# Patient Record
Sex: FEMALE | Race: WHITE | Employment: FULL TIME | URBAN - METROPOLITAN AREA
[De-identification: names, ages, dates, MRNs, and addresses within clinical notes are randomized per-mention and may not be internally consistent; named-entity substitution may affect disease eponyms.]

---

## 2017-05-22 ENCOUNTER — HOSPITAL ENCOUNTER (OUTPATIENT)
Dept: GENERAL RADIOLOGY | Age: 52
Discharge: HOME | End: 2017-05-22
Attending: FAMILY MEDICINE
Payer: COMMERCIAL

## 2017-05-22 ENCOUNTER — TELEPHONE (OUTPATIENT)
Dept: FAMILY MEDICINE CLINIC | Facility: CLINIC | Age: 52
End: 2017-05-22

## 2017-05-22 ENCOUNTER — OFFICE VISIT (OUTPATIENT)
Dept: FAMILY MEDICINE CLINIC | Facility: CLINIC | Age: 52
End: 2017-05-22

## 2017-05-22 VITALS
HEIGHT: 64 IN | BODY MASS INDEX: 36.6 KG/M2 | WEIGHT: 214.38 LBS | HEART RATE: 108 BPM | DIASTOLIC BLOOD PRESSURE: 88 MMHG | RESPIRATION RATE: 16 BRPM | SYSTOLIC BLOOD PRESSURE: 120 MMHG

## 2017-05-22 DIAGNOSIS — M25.511 ACUTE PAIN OF RIGHT SHOULDER: ICD-10-CM

## 2017-05-22 DIAGNOSIS — S43.421A SPRAIN OF RIGHT ROTATOR CUFF CAPSULE, INITIAL ENCOUNTER: Primary | ICD-10-CM

## 2017-05-22 DIAGNOSIS — M25.511 ACUTE PAIN OF RIGHT SHOULDER: Primary | ICD-10-CM

## 2017-05-22 PROCEDURE — 99214 OFFICE O/P EST MOD 30 MIN: CPT | Performed by: FAMILY MEDICINE

## 2017-05-22 PROCEDURE — 73030 X-RAY EXAM OF SHOULDER: CPT | Performed by: FAMILY MEDICINE

## 2017-05-22 RX ORDER — TOPIRAMATE 100 MG/1
2 TABLET, FILM COATED ORAL EVERY MORNING
COMMUNITY
Start: 2017-04-27 | End: 2018-01-15

## 2017-05-22 RX ORDER — TRAZODONE HYDROCHLORIDE 50 MG/1
TABLET ORAL
COMMUNITY
Start: 2017-04-27 | End: 2017-12-04

## 2017-05-22 RX ORDER — TIZANIDINE 4 MG/1
4 TABLET ORAL NIGHTLY PRN
Qty: 20 TABLET | Refills: 0 | Status: SHIPPED | OUTPATIENT
Start: 2017-05-22 | End: 2018-02-26

## 2017-05-22 NOTE — PROGRESS NOTES
Key Hastings is a 47year old female. HPI:       Shoulder Pain: Right shoulder pain, started yesterday after trying to start the . Felt immediate pain when she tried to start the lawnmower.   The pain radiates partly down her arm towards the el Smokeless Status: Never Used                        Alcohol Use: Yes           0.0 - 0.5 oz/week       0-1 Standard drinks or equivalent per week       REVIEW OF SYSTEMS:   Review of Systems   Constitutional: Negative for fever, chills and fatigue. XR SHOULDER, COMPLETE (MIN 2 VIEWS), RIGHT (CPT=73030)         TECHNIQUE:  Multiple views were obtained.      COMPARISON:  None.      INDICATIONS:  M25.511 Pain in right shoulder      PATIENT STATED HISTORY: (As transcribed by Technologist)  Injury to righ

## 2017-05-22 NOTE — PATIENT INSTRUCTIONS
Self-Care for Strains and Sprains  Most minor strains and sprains can be treated with self-care. Recovering from a strain or sprain may take 6 to 8 weeks. Your self-care goal is to reduce pain and immobilize the injury to speed healing.      A sprain inju · Pain increases or doesn’t improve in 4 days. · When pressing along the injured area, you notice a spot that is especially painful. Date Last Reviewed: 9/29/2015  © 3260-2805 The [address] [facility], [address] [place] [address].  All rig

## 2017-05-22 NOTE — TELEPHONE ENCOUNTER
Per Dr Joaquin Souza to get her in today at 3:45 and order STAT xray of right shoulder    S/w patient.  She will call central scheduling for xray and will come to office to see Dr Berhane Verma  Date Time Provider Andree Grant   5/22/2017 3

## 2017-12-04 ENCOUNTER — OFFICE VISIT (OUTPATIENT)
Dept: FAMILY MEDICINE CLINIC | Facility: CLINIC | Age: 52
End: 2017-12-04

## 2017-12-04 VITALS
RESPIRATION RATE: 16 BRPM | HEIGHT: 64 IN | WEIGHT: 220.38 LBS | HEART RATE: 82 BPM | BODY MASS INDEX: 37.62 KG/M2 | SYSTOLIC BLOOD PRESSURE: 132 MMHG | DIASTOLIC BLOOD PRESSURE: 84 MMHG

## 2017-12-04 DIAGNOSIS — F33.1 MODERATE EPISODE OF RECURRENT MAJOR DEPRESSIVE DISORDER (HCC): ICD-10-CM

## 2017-12-04 DIAGNOSIS — Z12.39 SCREENING FOR MALIGNANT NEOPLASM OF BREAST: ICD-10-CM

## 2017-12-04 DIAGNOSIS — F98.8 ATTENTION DEFICIT DISORDER (ADD) WITHOUT HYPERACTIVITY: ICD-10-CM

## 2017-12-04 DIAGNOSIS — Z79.899 HIGH RISK MEDICATION USE: ICD-10-CM

## 2017-12-04 DIAGNOSIS — R94.31 SHORTENED PR INTERVAL: ICD-10-CM

## 2017-12-04 DIAGNOSIS — Z51.81 THERAPEUTIC DRUG MONITORING: Primary | ICD-10-CM

## 2017-12-04 PROCEDURE — 93000 ELECTROCARDIOGRAM COMPLETE: CPT | Performed by: FAMILY MEDICINE

## 2017-12-04 PROCEDURE — 99214 OFFICE O/P EST MOD 30 MIN: CPT | Performed by: FAMILY MEDICINE

## 2017-12-04 NOTE — PROGRESS NOTES
Jovita Lopez is a 47year old female. HPI:     Patient is accompanied by her  who is pleasant and supportive. Requesting new psychiatrist.  Patient's current psychiatrist treating her for ADD and depression.   Currently has to see current psychia disorder) 2/11/2015   • Anxiety state, unspecified    • BMI 34.0-34.9,adult 2/11/2015   • BMI 35.0-35.9,adult 3/23/2015   • Depression    • Esophageal reflux 2013   • Fracture, jaw ([facility] [address] 1975   • Obesity 2/11/2015   • Obesity, unspecified    • Unspecified rhythm and normal heart sounds. No murmur heard. Edema not present. Pulmonary/Chest: Effort normal and breath sounds normal.   Neurological: She is alert and oriented to person, place, and time. Psychiatric: She has a normal mood and affect.  Her be years at 70 mg daily and has not had any chest pain or palpitations. Continue Vyvanse 70 mg daily for now. Recommend consult psychiatrist for medication management, if deemed stable and no changes recommended, okay to return back to me.     - ELECTROCARDI

## 2017-12-05 PROBLEM — R94.31 SHORTENED PR INTERVAL: Status: ACTIVE | Noted: 2017-12-05

## 2017-12-19 ENCOUNTER — TELEPHONE (OUTPATIENT)
Dept: FAMILY MEDICINE CLINIC | Facility: CLINIC | Age: 52
End: 2017-12-19

## 2017-12-19 NOTE — TELEPHONE ENCOUNTER
Antonieta Winston, [facility] sent to DO Jonnathan Martin Dr.. I spoke to your patient regarding behavioral health services.  She has a psychiatry appointment scheduled with KE Vuong with the B[facility] clinic at [facility]

## 2018-01-16 ENCOUNTER — TELEPHONE (OUTPATIENT)
Dept: FAMILY MEDICINE CLINIC | Facility: CLINIC | Age: 53
End: 2018-01-16

## 2018-01-16 NOTE — TELEPHONE ENCOUNTER
Documentation received from [facility]:    Phil Slider, APN sent to DO Dr. Kenny Figueredo,     I saw your patient yesterday for a mental health evaluation where her medication regimen was evaluated for ADHD - predominantly

## 2018-01-17 ENCOUNTER — MYAURORA ACCOUNT LINK (OUTPATIENT)
Dept: OTHER | Age: 53
End: 2018-01-17

## 2018-01-17 ENCOUNTER — PRIOR ORIGINAL RECORDS (OUTPATIENT)
Dept: OTHER | Age: 53
End: 2018-01-17

## 2018-01-26 ENCOUNTER — PRIOR ORIGINAL RECORDS (OUTPATIENT)
Dept: OTHER | Age: 53
End: 2018-01-26

## 2018-01-26 ENCOUNTER — MYAURORA ACCOUNT LINK (OUTPATIENT)
Dept: OTHER | Age: 53
End: 2018-01-26

## 2018-01-30 ENCOUNTER — HOSPITAL ENCOUNTER (OUTPATIENT)
Dept: CV DIAGNOSTICS | Facility: HOSPITAL | Age: 53
Discharge: HOME | End: 2018-01-30
Attending: INTERNAL MEDICINE
Payer: COMMERCIAL

## 2018-01-30 DIAGNOSIS — R94.31 ABNORMAL EKG: ICD-10-CM

## 2018-01-30 DIAGNOSIS — F98.8 ADD (ATTENTION DEFICIT DISORDER): ICD-10-CM

## 2018-01-30 PROCEDURE — 93350 STRESS TTE ONLY: CPT | Performed by: INTERNAL MEDICINE

## 2018-01-30 PROCEDURE — 93018 CV STRESS TEST I&R ONLY: CPT | Performed by: INTERNAL MEDICINE

## 2018-01-30 PROCEDURE — 93017 CV STRESS TEST TRACING ONLY: CPT | Performed by: INTERNAL MEDICINE

## 2018-02-01 ENCOUNTER — PRIOR ORIGINAL RECORDS (OUTPATIENT)
Dept: OTHER | Age: 53
End: 2018-02-01

## 2018-02-26 ENCOUNTER — OFFICE VISIT (OUTPATIENT)
Dept: FAMILY MEDICINE CLINIC | Facility: CLINIC | Age: 53
End: 2018-02-26

## 2018-02-26 VITALS
DIASTOLIC BLOOD PRESSURE: 82 MMHG | HEART RATE: 88 BPM | HEIGHT: 64 IN | SYSTOLIC BLOOD PRESSURE: 114 MMHG | RESPIRATION RATE: 16 BRPM | BODY MASS INDEX: 37.8 KG/M2 | WEIGHT: 221.38 LBS

## 2018-02-26 DIAGNOSIS — Z00.00 LABORATORY EXAM ORDERED AS PART OF ROUTINE GENERAL MEDICAL EXAMINATION: ICD-10-CM

## 2018-02-26 DIAGNOSIS — Z01.419 ENCOUNTER FOR GYNECOLOGICAL EXAMINATION WITHOUT ABNORMAL FINDING: Primary | ICD-10-CM

## 2018-02-26 DIAGNOSIS — Z12.4 SCREENING FOR MALIGNANT NEOPLASM OF CERVIX: ICD-10-CM

## 2018-02-26 DIAGNOSIS — Z12.11 SCREENING FOR MALIGNANT NEOPLASM OF COLON: ICD-10-CM

## 2018-02-26 DIAGNOSIS — Z00.00 ROUTINE GENERAL MEDICAL EXAMINATION AT A HEALTH CARE FACILITY: ICD-10-CM

## 2018-02-26 DIAGNOSIS — E66.01 SEVERE OBESITY (BMI 35.0-39.9): ICD-10-CM

## 2018-02-26 PROCEDURE — 88175 CYTOPATH C/V AUTO FLUID REDO: CPT | Performed by: FAMILY MEDICINE

## 2018-02-26 PROCEDURE — 99396 PREV VISIT EST AGE 40-64: CPT | Performed by: FAMILY MEDICINE

## 2018-02-26 PROCEDURE — 87624 HPV HI-RISK TYP POOLED RSLT: CPT | Performed by: FAMILY MEDICINE

## 2018-02-26 NOTE — PROGRESS NOTES
HPI:   Loretta David is a 47year old female who presents for her annual wellness visit. Symptoms: denies discharge, itching, burning or dysuria.    Last PAP: 6/2013  Abnormal PAP: no  Sexually active: yes   Last colonoscopy: n/a  Last mammogram: 6/2013  S Relation Age of Onset   • Diabetes Father    • depression[Other] [OTHER] Father    • hypothyroid[Other] [OTHER] Father    • Heart Disease Father    • Cancer Father      Prostrate   • Heart Disorder Father    • Hypertension Father    • Diabetes Mother    • polyuria, or excessive sweating.   LYMPHATICS: denies swollen glands      EXAM:   /82 (BP Location: Left arm, Patient Position: Sitting, Cuff Size: large)   Pulse 88   Resp 16   Ht 64\"   Wt 221 lb 6.4 oz   BMI 38.00 kg/m²   Body mass index is 38 kg/m pending. 2. Routine general medical examination at a health care facility  Patient provided handout on women's health and prevention. Routine health profile labs pending. Healthy diet and regular exercise discussed and encouraged.      3. Severe obesity

## 2018-02-26 NOTE — PATIENT INSTRUCTIONS
Please schedule your screening mammogram          SCHEDULING [facility] LAB APPOINTMENTS ONLINE    Lab appointments can now be scheduled online at [email or web address]    · Go to [email or web address]  · In Search type Lab  · Click \"Lab services\"  · Click \"Kartik are over 79, ask your healthcare provider how often you should have a mammogram.   Pap test: at least every 3 years if you have ever had sex and have not had your uterus removed.  Your healthcare provider may recommend more frequent screening if you have ha have an increased risk (for example, because you smoke or do not get regular exercise). Osteoporosis is a disease that thins and weakens bones to the point where they break easily.    Hearing test: if you are 72 or older   Vision test: if you are 65 or olde age 15 to 25 years, who have not had hepatitis or a hepatitis shot and for all adults who are at risk of infection.  This includes, for example, women who have more than 1 sex partner or whose partner has more than 1 partner, or who have a sexually transmit relationship with just one partner who has no other partners. Hormone use: During or after menopause, discuss the risks and benefits of use of estrogen and progesterone replacement with your healthcare provider.    Osteoporosis prevention:  Advise 1,500 m

## 2018-02-27 LAB — HPV I/H RISK 1 DNA SPEC QL NAA+PROBE: NEGATIVE

## 2018-02-28 LAB — LAST PAP RESULT: NORMAL

## 2018-03-24 ENCOUNTER — LAB ENCOUNTER (OUTPATIENT)
Dept: LAB | Age: 53
End: 2018-03-24
Attending: FAMILY MEDICINE
Payer: COMMERCIAL

## 2018-03-24 DIAGNOSIS — Z00.00 LABORATORY EXAM ORDERED AS PART OF ROUTINE GENERAL MEDICAL EXAMINATION: ICD-10-CM

## 2018-03-24 LAB
25-HYDROXYVITAMIN D (TOTAL): 18.7 NG/ML (ref 30–100)
ALBUMIN SERPL-MCNC: 3.5 G/DL (ref 3.5–4.8)
ALP LIVER SERPL-CCNC: 99 U/L (ref 41–108)
ALT SERPL-CCNC: 24 U/L (ref 14–54)
AST SERPL-CCNC: 15 U/L (ref 15–41)
BASOPHILS # BLD AUTO: 0.05 X10(3) UL (ref 0–0.1)
BASOPHILS NFR BLD AUTO: 0.7 %
BILIRUB SERPL-MCNC: 0.3 MG/DL (ref 0.1–2)
BUN BLD-MCNC: 11 MG/DL (ref 8–20)
CALCIUM BLD-MCNC: 8.9 MG/DL (ref 8.3–10.3)
CHLORIDE: 109 MMOL/L (ref 101–111)
CHOLEST SMN-MCNC: 175 MG/DL (ref ?–200)
CO2: 28 MMOL/L (ref 22–32)
CREAT BLD-MCNC: 0.8 MG/DL (ref 0.55–1.02)
EOSINOPHIL # BLD AUTO: 0.14 X10(3) UL (ref 0–0.3)
EOSINOPHIL NFR BLD AUTO: 1.8 %
ERYTHROCYTE [DISTWIDTH] IN BLOOD BY AUTOMATED COUNT: 13 % (ref 11.5–16)
FREE T4: 0.9 NG/DL (ref 0.9–1.8)
GLUCOSE BLD-MCNC: 96 MG/DL (ref 70–99)
HCT VFR BLD AUTO: 46.9 % (ref 34–50)
HDLC SERPL-MCNC: 59 MG/DL (ref 45–?)
HDLC SERPL: 2.97 {RATIO} (ref ?–4.44)
HGB BLD-MCNC: 14.2 G/DL (ref 12–16)
IMMATURE GRANULOCYTE COUNT: 0.02 X10(3) UL (ref 0–1)
IMMATURE GRANULOCYTE RATIO %: 0.3 %
LDLC SERPL CALC-MCNC: 89 MG/DL (ref ?–130)
LYMPHOCYTES # BLD AUTO: 2.35 X10(3) UL (ref 0.9–4)
LYMPHOCYTES NFR BLD AUTO: 30.9 %
M PROTEIN MFR SERPL ELPH: 7.2 G/DL (ref 6.1–8.3)
MCH RBC QN AUTO: 29.2 PG (ref 27–33.2)
MCHC RBC AUTO-ENTMCNC: 30.3 G/DL (ref 31–37)
MCV RBC AUTO: 96.3 FL (ref 81–100)
MONOCYTES # BLD AUTO: 0.54 X10(3) UL (ref 0.1–1)
MONOCYTES NFR BLD AUTO: 7.1 %
NEUTROPHIL ABS PRELIM: 4.51 X10 (3) UL (ref 1.3–6.7)
NEUTROPHILS # BLD AUTO: 4.51 X10(3) UL (ref 1.3–6.7)
NEUTROPHILS NFR BLD AUTO: 59.2 %
NONHDLC SERPL-MCNC: 116 MG/DL (ref ?–130)
PLATELET # BLD AUTO: 294 10(3)UL (ref 150–450)
POTASSIUM SERPL-SCNC: 4.1 MMOL/L (ref 3.6–5.1)
RBC # BLD AUTO: 4.87 X10(6)UL (ref 3.8–5.1)
RED CELL DISTRIBUTION WIDTH-SD: 46.3 FL (ref 35.1–46.3)
SODIUM SERPL-SCNC: 143 MMOL/L (ref 136–144)
TRIGL SERPL-MCNC: 134 MG/DL (ref ?–150)
TSI SER-ACNC: 2.69 MIU/ML (ref 0.35–5.5)
VLDLC SERPL CALC-MCNC: 27 MG/DL (ref 5–40)
WBC # BLD AUTO: 7.6 X10(3) UL (ref 4–13)

## 2018-03-24 PROCEDURE — 36415 COLL VENOUS BLD VENIPUNCTURE: CPT | Performed by: FAMILY MEDICINE

## 2018-03-24 PROCEDURE — 80061 LIPID PANEL: CPT | Performed by: FAMILY MEDICINE

## 2018-03-24 PROCEDURE — 80050 GENERAL HEALTH PANEL: CPT | Performed by: FAMILY MEDICINE

## 2018-03-24 PROCEDURE — 82306 VITAMIN D 25 HYDROXY: CPT | Performed by: FAMILY MEDICINE

## 2018-03-24 PROCEDURE — 84439 ASSAY OF FREE THYROXINE: CPT | Performed by: FAMILY MEDICINE

## 2018-03-26 ENCOUNTER — OFFICE VISIT (OUTPATIENT)
Dept: FAMILY MEDICINE CLINIC | Facility: CLINIC | Age: 53
End: 2018-03-26

## 2018-03-26 VITALS
HEIGHT: 64 IN | BODY MASS INDEX: 37.62 KG/M2 | SYSTOLIC BLOOD PRESSURE: 137 MMHG | RESPIRATION RATE: 16 BRPM | WEIGHT: 220.38 LBS | HEART RATE: 107 BPM | DIASTOLIC BLOOD PRESSURE: 85 MMHG

## 2018-03-26 DIAGNOSIS — F33.1 MAJOR DEPRESSIVE DISORDER, RECURRENT EPISODE, MODERATE (HCC): ICD-10-CM

## 2018-03-26 DIAGNOSIS — Z79.899 HIGH RISK MEDICATION USE: ICD-10-CM

## 2018-03-26 DIAGNOSIS — E55.9 VITAMIN D DEFICIENCY: ICD-10-CM

## 2018-03-26 DIAGNOSIS — F98.8 ATTENTION DEFICIT DISORDER (ADD) WITHOUT HYPERACTIVITY: ICD-10-CM

## 2018-03-26 DIAGNOSIS — Z51.81 THERAPEUTIC DRUG MONITORING: Primary | ICD-10-CM

## 2018-03-26 PROCEDURE — 99214 OFFICE O/P EST MOD 30 MIN: CPT | Performed by: FAMILY MEDICINE

## 2018-03-26 RX ORDER — BUPROPION HYDROCHLORIDE 450 MG/1
450 TABLET, FILM COATED, EXTENDED RELEASE ORAL EVERY MORNING
Qty: 90 TABLET | Refills: 1 | Status: SHIPPED | OUTPATIENT
Start: 2018-03-26 | End: 2018-04-05

## 2018-03-26 RX ORDER — LISDEXAMFETAMINE DIMESYLATE 70 MG/1
1 CAPSULE ORAL DAILY
Refills: 0 | COMMUNITY
Start: 2018-02-05 | End: 2018-07-05 | Stop reason: SDUPTHER

## 2018-03-26 RX ORDER — ERGOCALCIFEROL 1.25 MG/1
50000 CAPSULE ORAL
Qty: 12 CAPSULE | Refills: 0 | Status: SHIPPED | OUTPATIENT
Start: 2018-03-26 | End: 2018-06-12

## 2018-03-26 NOTE — PATIENT INSTRUCTIONS
After completing the prescription strength Vitamin D, start taking otc Vitamin D 1000 to 2000 units once a day with the biggest meal of the day.

## 2018-03-26 NOTE — PROGRESS NOTES
Bradford Reid is a 47year old female. HPI:       Pt returns after having been seen at the [facility] medication management clinic. Pt was deemed to be stable and safe on current dosing of Vyvanse 70mg daily and Bupropion XL total of 450mg daily.     D reflux 2013   • Fracture, jaw ([facility] 1975   • Major depressive disorder, recurrent episode, moderate ([facility] 3/26/2018   • Obesity 2/11/2015   • Obesity, unspecified    • Severe obesity (BMI 35.0-39.9) ([facility] 2/26/2018    BMI 38.00 2-   • Unspecified sl urinary symptoms  NEURO: denies headaches/dizziness/fainting/weakness/change in vision  PSYCH: denies depression and anxiety      Immunization History  Administered            Date(s) Administered    Influenza             10/01/2012  10/02/2017      TDAP 25-Hydroxyvitamin D (Total) 18.7 (L) 30.0 - 100.0 ng/mL   -CBC W/ DIFFERENTIAL   Collection Time: 03/24/18  8:09 AM   Result Value Ref Range   WBC 7.6 4.0 - 13.0 x10(3) uL   RBC 4.87 3.80 - 5.10 x10(6)uL   HGB 14.2 12.0 - 16.0 g/dL   HCT 46.9 34.0 - 50. 0 medication use  - Lisdexamfetamine Dimesylate (VYVANSE) 70 MG Oral Cap; Take 1 capsule (70 mg total) by mouth daily. Dispense: 30 capsule; Refill: 0  - Lisdexamfetamine Dimesylate (VYVANSE) 70 MG Oral Cap; Take 1 capsule (70 mg total) by mouth daily.   Dis capsule (50,000 Units total) by mouth every 7 days. Take with the biggest meal of the day or fatty meal of the day      Lisdexamfetamine Dimesylate (VYVANSE) 70 MG Oral Cap 30 capsule 0      Sig: Take 1 capsule (70 mg total) by mouth daily.       Meeta Serum

## 2018-03-27 ENCOUNTER — OFFICE VISIT (OUTPATIENT)
Dept: SURGERY | Facility: CLINIC | Age: 53
End: 2018-03-27

## 2018-03-27 VITALS
HEART RATE: 111 BPM | SYSTOLIC BLOOD PRESSURE: 158 MMHG | BODY MASS INDEX: 36.65 KG/M2 | TEMPERATURE: 99 F | WEIGHT: 220 LBS | HEIGHT: 65 IN | DIASTOLIC BLOOD PRESSURE: 100 MMHG

## 2018-03-27 DIAGNOSIS — R19.5 MUCOUS IN STOOLS: Primary | ICD-10-CM

## 2018-03-27 DIAGNOSIS — Z83.71 FAMILY HISTORY OF COLONIC POLYPS: ICD-10-CM

## 2018-03-27 PROCEDURE — 99244 OFF/OP CNSLTJ NEW/EST MOD 40: CPT | Performed by: COLON & RECTAL SURGERY

## 2018-03-27 RX ORDER — POLYETHYLENE GLYCOL 3350, SODIUM CHLORIDE, SODIUM BICARBONATE, POTASSIUM CHLORIDE 420; 11.2; 5.72; 1.48 G/4L; G/4L; G/4L; G/4L
POWDER, FOR SOLUTION ORAL
Qty: 1 BOTTLE | Refills: 0 | Status: SHIPPED | OUTPATIENT
Start: 2018-03-27 | End: 2018-07-05 | Stop reason: ALTCHOICE

## 2018-03-27 NOTE — H&P
New Patient Visit Note       Active Problems      1. Mucous in stools    2.  Family history of colonic polyps, Father        Chief Complaint   Patient presents with:  Colonoscopy: Here for colonoscopy consult - denies issues, ref by Samual Pallas      History of pain.  The patient does not have weight loss as a symptom. The patient does not abdominal distension. The patient does not have a family history of colon cancer in a Mother, Father, Sister, Brother, or Child.     The patient does have a family history 3/26/2018   • BMI 34.0-34.9,adult 2/11/2015   • BMI 35.0-35.9,adult 3/23/2015   • Depression    • Esophageal reflux 2013   • Fracture, jaw ([facility] 1975   • Major depressive disorder, recurrent episode, moderate ([facility] 3/26/2018   • Obesity 2/11/2015   • Kim Richard or equivalent: 0 - 1 per week    Drug use: No            Other Topics            Concern  Caffeine Concern        Yes    Comment:1-2 cups of coffee per day  Exercise                No  Seat Belt               Yes         Current Outpatient Prescriptions: heard.  Pulmonary/Chest: Effort normal and breath sounds normal. No accessory muscle usage. No tachypnea. No respiratory distress. She has no decreased breath sounds. She has no wheezes. She has no rhonchi. She has no rales. She exhibits no mass.    Abdomin normal that her symptoms may be related to colonic spasms. The patient continues to have 1-2 episodes of oily deposits within the stool annually. She states that this results in loose bowel movements.   She does not have urgency or frequency with these colonoscopy.     Paul Shane MD

## 2018-03-30 PROBLEM — Z83.71 FAMILY HISTORY OF COLONIC POLYPS: Status: ACTIVE | Noted: 2018-03-30

## 2018-03-30 PROBLEM — Z83.719 FAMILY HISTORY OF COLONIC POLYPS: Status: ACTIVE | Noted: 2018-03-30

## 2018-03-30 PROBLEM — R19.5 MUCOUS IN STOOLS: Status: ACTIVE | Noted: 2018-03-30

## 2018-03-30 NOTE — PATIENT INSTRUCTIONS
This patient presents with a referral for primary care physician, Dr. Frances Tai for colonoscopy consultation. The patient has had a colonoscopy approximately 11 years ago.   She states that she was having oily deposits within her stool which prompt possible outcomes was fully discussed. The patient seemed to understand the conversation and its details. Consent for surgery was confirmed with the patient.

## 2018-04-05 ENCOUNTER — TELEPHONE (OUTPATIENT)
Dept: FAMILY MEDICINE CLINIC | Facility: CLINIC | Age: 53
End: 2018-04-05

## 2018-04-05 RX ORDER — BUPROPION HYDROCHLORIDE 150 MG/1
450 TABLET ORAL DAILY
Qty: 270 TABLET | Refills: 1 | Status: SHIPPED | OUTPATIENT
Start: 2018-04-05 | End: 2018-07-05

## 2018-04-05 NOTE — TELEPHONE ENCOUNTER
Pt called and said the 450mg tablet of Bupropion is not covered by her insurance, it was going to cost her $1025.00. She would like to go back to the way she was taking it 150mg 3 daily.

## 2018-07-02 ENCOUNTER — TELEPHONE (OUTPATIENT)
Dept: FAMILY MEDICINE CLINIC | Facility: CLINIC | Age: 53
End: 2018-07-02

## 2018-07-02 NOTE — TELEPHONE ENCOUNTER
Patient needs appt for refill of vyvanse  Please call to schedule appt  [phone] (home) [phone] (work)

## 2018-07-02 NOTE — TELEPHONE ENCOUNTER
Pt called and said she needs a refill on Vyvance 70mg 1 daily. She did see a psych doctor as instructed and was told the Aysha Elliser is ok for her to take. Call her at work when the Rx is ready to be picked up.

## 2018-07-02 NOTE — TELEPHONE ENCOUNTER
Future Appointments  Date Time Provider Andree Grant   7/5/2018 8:30 AM Gina Ritter DO [facility] [place]   7/19/2018 4:30 PM Gina Ritter DO [facility] [place]

## 2018-07-05 ENCOUNTER — OFFICE VISIT (OUTPATIENT)
Dept: FAMILY MEDICINE CLINIC | Facility: CLINIC | Age: 53
End: 2018-07-05

## 2018-07-05 VITALS
SYSTOLIC BLOOD PRESSURE: 118 MMHG | DIASTOLIC BLOOD PRESSURE: 84 MMHG | HEART RATE: 76 BPM | BODY MASS INDEX: 37.61 KG/M2 | WEIGHT: 223 LBS | HEIGHT: 64.5 IN

## 2018-07-05 DIAGNOSIS — Z51.81 THERAPEUTIC DRUG MONITORING: Primary | ICD-10-CM

## 2018-07-05 DIAGNOSIS — F98.8 ATTENTION DEFICIT DISORDER (ADD) WITHOUT HYPERACTIVITY: ICD-10-CM

## 2018-07-05 DIAGNOSIS — F33.1 MAJOR DEPRESSIVE DISORDER, RECURRENT EPISODE, MODERATE (HCC): ICD-10-CM

## 2018-07-05 DIAGNOSIS — Z79.899 HIGH RISK MEDICATION USE: ICD-10-CM

## 2018-07-05 DIAGNOSIS — G44.209 TENSION HEADACHE: ICD-10-CM

## 2018-07-05 PROCEDURE — 99214 OFFICE O/P EST MOD 30 MIN: CPT | Performed by: FAMILY MEDICINE

## 2018-07-05 RX ORDER — TIZANIDINE HYDROCHLORIDE 4 MG/1
4 CAPSULE, GELATIN COATED ORAL NIGHTLY PRN
Qty: 30 CAPSULE | Refills: 0 | Status: SHIPPED | OUTPATIENT
Start: 2018-07-05 | End: 2019-01-24

## 2018-07-05 RX ORDER — LISDEXAMFETAMINE DIMESYLATE 70 MG/1
70 CAPSULE ORAL DAILY
Refills: 0 | Status: CANCELLED | OUTPATIENT
Start: 2018-07-05

## 2018-07-05 RX ORDER — BUPROPION HYDROCHLORIDE 150 MG/1
450 TABLET ORAL DAILY
Qty: 270 TABLET | Refills: 1 | Status: SHIPPED | OUTPATIENT
Start: 2018-07-05 | End: 2019-01-24

## 2018-07-05 NOTE — PROGRESS NOTES
Ellie Rodgers is a 47year old female. HPI:     Pt presents with her  who is pleasant and supportive. ADD:  Vyvanse 70 mg daily with good benefit. Occ does not take it. Depression and anxiety:  Taking Wellbutrin  mg, 3 once a day.   D Obesity, unspecified    • Severe obesity (BMI 35.0-39.9) ([address] 2/26/2018    BMI 38.00 2-   • Unspecified sleep apnea 2005    Mild      Past Surgical History:  08/2002: ABLATION  04/03/2018: COLONOSCOPY      Comment: Dr. Carolynn Hathaway  02/2003: Cassidy Jefferson twitches. PSYCH: denies mood swings.       Immunization History  Administered            Date(s) Administered    Influenza             10/01/2012  10/02/2017      TDAP                  03/23/2015      EXAM:   /84 (BP Location: Left arm, Patient Posit Lisdexamfetamine Dimesylate (VYVANSE) 70 MG Oral Cap; Take 1 capsule (70 mg total) by mouth daily. Dispense: 30 capsule; Refill: 0  - Lisdexamfetamine Dimesylate (VYVANSE) 70 MG Oral Cap; Take 1 capsule (70 mg total) by mouth every morning.   Dispense: 30 Oral Cap 30 capsule 0      Sig: Take 1 capsule (70 mg total) by mouth daily. Lisdexamfetamine Dimesylate (VYVANSE) 70 MG Oral Cap 30 capsule 0      Sig: Take 1 capsule (70 mg total) by mouth daily.       Lisdexamfetamine Dimesylate (VYVANSE) 70 MG Oral

## 2018-07-05 NOTE — PATIENT INSTRUCTIONS
Okay to call in 3 months for refill on the Vyvanse. Tension Headache    A muscle tension headache is a very common cause of head pain. It’s also called a stress headache.  When some people are under stress, they tense the muscles of their s may need to see a headache specialist (neurologist) if you continue to have headaches.   When to seek medical advice  Call your healthcare provider right away if any of these occur:  · Your head pain gets worse during sexual intercourse or strenuous activit Weightlifting and other activities that require upper body strength can lead to tight neck and shoulder muscles. · Jaw tension. Clenching your jaw or grinding your teeth can result in tension and pain.  This may happen while you sleep without your knowing It is important to have a regular sleep cycle and to avoid skipping meals. Exercise can help  Exercise can improve overall health and help you to relax. · Neck exercises help keep your neck muscles relaxed during the day.  Try the neck exercises shown on

## 2018-10-18 NOTE — TELEPHONE ENCOUNTER
Spoke to patient and she said that she needs her next 3 refills of her Vyvanse.   Scripts pended for approval.

## 2018-10-22 NOTE — TELEPHONE ENCOUNTER
Pt called asking if her prescription were ready. She said she is out and she is having a rough week.

## 2019-01-24 ENCOUNTER — OFFICE VISIT (OUTPATIENT)
Dept: FAMILY MEDICINE CLINIC | Facility: CLINIC | Age: 54
End: 2019-01-24
Payer: COMMERCIAL

## 2019-01-24 VITALS
HEART RATE: 108 BPM | HEIGHT: 64.5 IN | DIASTOLIC BLOOD PRESSURE: 92 MMHG | BODY MASS INDEX: 37.95 KG/M2 | WEIGHT: 225 LBS | SYSTOLIC BLOOD PRESSURE: 120 MMHG

## 2019-01-24 DIAGNOSIS — F33.1 MAJOR DEPRESSIVE DISORDER, RECURRENT EPISODE, MODERATE (HCC): ICD-10-CM

## 2019-01-24 DIAGNOSIS — G44.209 TENSION HEADACHE: ICD-10-CM

## 2019-01-24 DIAGNOSIS — Z51.81 THERAPEUTIC DRUG MONITORING: ICD-10-CM

## 2019-01-24 PROCEDURE — 99214 OFFICE O/P EST MOD 30 MIN: CPT | Performed by: FAMILY MEDICINE

## 2019-01-24 RX ORDER — BUPROPION HYDROCHLORIDE 150 MG/1
450 TABLET ORAL DAILY
Qty: 270 TABLET | Refills: 1 | Status: SHIPPED | OUTPATIENT
Start: 2019-01-24 | End: 2019-09-12 | Stop reason: ALTCHOICE

## 2019-01-24 RX ORDER — TIZANIDINE HYDROCHLORIDE 4 MG/1
4 CAPSULE, GELATIN COATED ORAL NIGHTLY PRN
Qty: 30 CAPSULE | Refills: 0 | Status: SHIPPED | OUTPATIENT
Start: 2019-01-24 | End: 2020-03-02

## 2019-01-24 NOTE — PROGRESS NOTES
Fernanda Bran is a 49year old female. HPI:       ADD:  Vyvanse 70 mg daily with good benefit. Occ does not take it. Depression and anxiety:  Taking Wellbutrin  mg, 3 once a day, Insurance won't cover the 450 mg tab.   Doing well with the [facility]. 2/11/2015   • BMI 35.0-35.9,adult 3/23/2015   • Depression    • Esophageal reflux 2013   • Fracture, jaw ([facility] 1975   • Major depressive disorder, recurrent episode, moderate ([facility] 3/26/2018   • Obesity 2/11/2015   • Obesity, unspecified    • Severe obesit pain/nausea/vomiting  : no urinary symptoms  NEURO: denies dizziness/fainting/weakness/change in vision. Denies numbness or tingling of extremities. Denies tics or twitches. PSYCH: denies mood swings, depression, or anxiety.       Immunization History  A Cap; Take 1 capsule (70 mg total) by mouth daily. Dispense: 30 capsule; Refill: 0  - Lisdexamfetamine Dimesylate (VYVANSE) 70 MG Oral Cap; Take 1 capsule (70 mg total) by mouth daily. Dispense: 30 capsule;  Refill: 0  - Lisdexamfetamine Dimesylate (VYVANS tablets (450 mg total) by mouth daily. • TiZANidine HCl 4 MG Oral Cap 30 capsule 0     Sig: Take 1 capsule (4 mg total) by mouth nightly as needed for Muscle spasms.    • Lisdexamfetamine Dimesylate (VYVANSE) 70 MG Oral Cap 30 capsule 0     Sig: Take 1 ca

## 2019-02-28 VITALS
DIASTOLIC BLOOD PRESSURE: 70 MMHG | SYSTOLIC BLOOD PRESSURE: 132 MMHG | WEIGHT: 217 LBS | HEIGHT: 65 IN | BODY MASS INDEX: 36.15 KG/M2 | HEART RATE: 90 BPM

## 2019-05-13 ENCOUNTER — TELEPHONE (OUTPATIENT)
Dept: FAMILY MEDICINE CLINIC | Facility: CLINIC | Age: 54
End: 2019-05-13

## 2019-05-13 NOTE — TELEPHONE ENCOUNTER
Brianne Duarte is calling for a 3 month refill on her Vyvance, please call [facility] at [phone] when ready

## 2019-05-13 NOTE — TELEPHONE ENCOUNTER
Script pended for approval.        From 1/24/2019 OV: [id number] Sammie Oquendo to call in 3 months for 3 month rx of the Vyvanse

## 2019-08-09 NOTE — TELEPHONE ENCOUNTER
Patient has upcomming appointment 8/27/19 at 2:30 pm.  Last fills were 5/13/19, 6/12/19, 7/12/19. Ok to fill x1 month? Script pended for one month. Last appointment was 1/24/19.

## 2019-08-09 NOTE — TELEPHONE ENCOUNTER
Zee Maharaj is calling for a refill on her Vyvance, she would like a call at [phone] may leave a message if no answer, it is a confidential voice mail

## 2019-09-12 ENCOUNTER — OFFICE VISIT (OUTPATIENT)
Dept: FAMILY MEDICINE CLINIC | Facility: CLINIC | Age: 54
End: 2019-09-12
Payer: COMMERCIAL

## 2019-09-12 VITALS
SYSTOLIC BLOOD PRESSURE: 120 MMHG | HEART RATE: 94 BPM | WEIGHT: 221 LBS | DIASTOLIC BLOOD PRESSURE: 82 MMHG | HEIGHT: 64.5 IN | BODY MASS INDEX: 37.27 KG/M2

## 2019-09-12 DIAGNOSIS — Z79.899 HIGH RISK MEDICATION USE: ICD-10-CM

## 2019-09-12 DIAGNOSIS — F90.0 ATTENTION DEFICIT HYPERACTIVITY DISORDER (ADHD), PREDOMINANTLY INATTENTIVE TYPE: ICD-10-CM

## 2019-09-12 DIAGNOSIS — E66.01 CLASS 2 SEVERE OBESITY DUE TO EXCESS CALORIES WITH SERIOUS COMORBIDITY AND BODY MASS INDEX (BMI) OF 37.0 TO 37.9 IN ADULT (HCC): ICD-10-CM

## 2019-09-12 DIAGNOSIS — Z51.81 THERAPEUTIC DRUG MONITORING: ICD-10-CM

## 2019-09-12 DIAGNOSIS — Z00.00 ROUTINE GENERAL MEDICAL EXAMINATION AT A HEALTH CARE FACILITY: Primary | ICD-10-CM

## 2019-09-12 DIAGNOSIS — Z12.31 ENCOUNTER FOR SCREENING MAMMOGRAM FOR MALIGNANT NEOPLASM OF BREAST: ICD-10-CM

## 2019-09-12 DIAGNOSIS — F33.42 RECURRENT MAJOR DEPRESSIVE DISORDER, IN FULL REMISSION (HCC): ICD-10-CM

## 2019-09-12 DIAGNOSIS — E55.9 VITAMIN D DEFICIENCY: ICD-10-CM

## 2019-09-12 PROBLEM — F98.8 ATTENTION DEFICIT DISORDER (ADD) WITHOUT HYPERACTIVITY: Status: RESOLVED | Noted: 2018-03-26 | Resolved: 2019-09-12

## 2019-09-12 PROBLEM — E66.812 CLASS 2 SEVERE OBESITY DUE TO EXCESS CALORIES WITH SERIOUS COMORBIDITY AND BODY MASS INDEX (BMI) OF 37.0 TO 37.9 IN ADULT (HCC): Status: ACTIVE | Noted: 2019-09-12

## 2019-09-12 PROBLEM — Z01.419 ENCOUNTER FOR GYNECOLOGICAL EXAMINATION WITHOUT ABNORMAL FINDING: Status: RESOLVED | Noted: 2018-02-26 | Resolved: 2019-09-12

## 2019-09-12 PROBLEM — S43.421A SPRAIN OF RIGHT ROTATOR CUFF CAPSULE: Status: RESOLVED | Noted: 2017-05-22 | Resolved: 2019-09-12

## 2019-09-12 PROBLEM — F33.1 MAJOR DEPRESSIVE DISORDER, RECURRENT EPISODE, MODERATE (HCC): Status: RESOLVED | Noted: 2018-03-26 | Resolved: 2019-09-12

## 2019-09-12 PROBLEM — R19.5 MUCOUS IN STOOLS: Status: RESOLVED | Noted: 2018-03-30 | Resolved: 2019-09-12

## 2019-09-12 PROCEDURE — 99396 PREV VISIT EST AGE 40-64: CPT | Performed by: FAMILY MEDICINE

## 2019-09-12 PROCEDURE — 99213 OFFICE O/P EST LOW 20 MIN: CPT | Performed by: FAMILY MEDICINE

## 2019-09-12 RX ORDER — BUPROPION HYDROCHLORIDE 450 MG/1
450 TABLET, FILM COATED, EXTENDED RELEASE ORAL EVERY MORNING
Qty: 90 TABLET | Refills: 1 | Status: SHIPPED | OUTPATIENT
Start: 2019-09-12 | End: 2019-10-12 | Stop reason: WASHOUT

## 2019-09-12 RX ORDER — BUPROPION HYDROCHLORIDE 450 MG/1
450 TABLET, FILM COATED, EXTENDED RELEASE ORAL EVERY MORNING
Qty: 90 TABLET | Refills: 1 | Status: SHIPPED | OUTPATIENT
Start: 2019-09-12 | End: 2019-09-12

## 2019-09-12 NOTE — PROGRESS NOTES
HPI:   Alysia Cherry is a 49year old female   Symptoms: denies discharge, itching, burning or dysuria. Using OTC for vaginal dryness. Last PAP: UTD  Sexually active: yes   Last colonoscopy: UTD  Last mammogram: ordered      ADHD:  Inattentive type.   Vyv Take 1 capsule (70 mg total) by mouth every morning. Disp: 30 capsule Rfl: 0   TiZANidine HCl 4 MG Oral Cap Take 1 capsule (4 mg total) by mouth nightly as needed for Muscle spasms.  Disp: 30 capsule Rfl: 0   Lisdexamfetamine Dimesylate (VYVANSE) 70 MG Oral Disorder Brother         hyperthyroid   • Eye Problems Brother         cataract   • Cancer Brother         Lung   • Hypertension Brother    • Eye Problems Brother         cataract   • Hypertension Brother    • Obesity Brother    • Depression Brother    • H normocephalic,ears, nose and throat are normal, mmm  EYES: PERRLA, EOMI, sclera, conjunctiva are clear  NECK: supple, no adenopathy/thyromegaly/masses  CHEST: no chest tenderness  BREAST: symmetrical, no suspicious mass, no nipple dimpling or discharge.   L Chloride      101 - 111 mmol/L 109    Carbon Dioxide, Total      22.0 - 32.0 mmol/L 28.0    Cholesterol, Total      <200 mg/dL 175 205 (H)   Triglycerides      <150 mg/dL 134 150 (H)   HDL Cholesterol      >45 mg/dL 59 66   LDL Cholesterol Calc      <130 risks, benefits, side effects and precautions discussed, patient verbalizes understanding. Questions encouraged and answered to patient's satisfaction. - buPROPion HCl ER, XL, (FORFIVO XL) 450 MG Oral Tablet 24 Hr; Take 450 mg by mouth every morning.   D including green leafy vegetables, fresh fruits and lean protein. Aerobic exercise 30 minutes five days a week for cardiovascular fitness and 45-60 minutes 6-7 days a week for weight loss.            Orders Placed This Encounter      Lipid Panel [E]      CB

## 2019-09-12 NOTE — PATIENT INSTRUCTIONS
Try to eat small meals every 3 hours. Recommend lean meats such as skinless chicken breast, 90% lean ground beef, lean ground turkey, pork loin, and any type of fish. Bake, broil or grill. No frying. Avoid cooking in oils.   Okay to Screening tests and vaccines are an important part of managing your health. A screening test is done to find possible disorders or diseases in people who don't have any symptoms.  The goal is to find a disease early so lifestyle changes can be made and you Colorectal cancer All women at average risk in this age group Multiple tests are available and are used at different times.  Possible tests include:  · Flexible sigmoidoscopy every 5 years, or  · Colonoscopy every 10 years, or  · CT colonography (virtual co Chickenpox (varicella) All women in this age group who have no record of this infection or vaccine 2 doses; the second dose should be given at least 4 weeks after the first dose   Hepatitis A Women at increased risk for infection – talk with your healthcar Diet and exercise Women who are overweight or obese When diagnosed, and then at routine exams   Sexually transmitted infection prevention Women at increased risk for infection – talk with your healthcare provider At routine exams   Use of daily aspirin Wom

## 2019-09-13 ENCOUNTER — LAB ENCOUNTER (OUTPATIENT)
Dept: LAB | Age: 54
End: 2019-09-13
Attending: FAMILY MEDICINE
Payer: COMMERCIAL

## 2019-09-13 DIAGNOSIS — E55.9 VITAMIN D DEFICIENCY: ICD-10-CM

## 2019-09-13 DIAGNOSIS — Z00.00 ROUTINE GENERAL MEDICAL EXAMINATION AT A HEALTH CARE FACILITY: ICD-10-CM

## 2019-09-13 LAB
ALBUMIN SERPL-MCNC: 3.2 G/DL (ref 3.4–5)
ALBUMIN/GLOB SERPL: 0.9 {RATIO} (ref 1–2)
ALP LIVER SERPL-CCNC: 89 U/L (ref 41–108)
ALT SERPL-CCNC: 27 U/L (ref 13–56)
ANION GAP SERPL CALC-SCNC: 7 MMOL/L (ref 0–18)
AST SERPL-CCNC: 18 U/L (ref 15–37)
BASOPHILS # BLD AUTO: 0.03 X10(3) UL (ref 0–0.2)
BASOPHILS NFR BLD AUTO: 0.5 %
BILIRUB SERPL-MCNC: 0.4 MG/DL (ref 0.1–2)
BUN BLD-MCNC: 15 MG/DL (ref 7–18)
BUN/CREAT SERPL: 22.1 (ref 10–20)
CALCIUM BLD-MCNC: 8.2 MG/DL (ref 8.5–10.1)
CHLORIDE SERPL-SCNC: 109 MMOL/L (ref 98–112)
CHOLEST SMN-MCNC: 173 MG/DL (ref ?–200)
CO2 SERPL-SCNC: 26 MMOL/L (ref 21–32)
CREAT BLD-MCNC: 0.68 MG/DL (ref 0.55–1.02)
DEPRECATED RDW RBC AUTO: 44.6 FL (ref 35.1–46.3)
EOSINOPHIL # BLD AUTO: 0.08 X10(3) UL (ref 0–0.7)
EOSINOPHIL NFR BLD AUTO: 1.5 %
ERYTHROCYTE [DISTWIDTH] IN BLOOD BY AUTOMATED COUNT: 13.2 % (ref 11–15)
GLOBULIN PLAS-MCNC: 3.6 G/DL (ref 2.8–4.4)
GLUCOSE BLD-MCNC: 90 MG/DL (ref 70–99)
HCT VFR BLD AUTO: 42.4 % (ref 35–48)
HDLC SERPL-MCNC: 56 MG/DL (ref 40–59)
HGB BLD-MCNC: 13.6 G/DL (ref 12–16)
IMM GRANULOCYTES # BLD AUTO: 0.01 X10(3) UL (ref 0–1)
IMM GRANULOCYTES NFR BLD: 0.2 %
LDLC SERPL CALC-MCNC: 96 MG/DL (ref ?–100)
LYMPHOCYTES # BLD AUTO: 1.63 X10(3) UL (ref 1–4)
LYMPHOCYTES NFR BLD AUTO: 29.7 %
M PROTEIN MFR SERPL ELPH: 6.8 G/DL (ref 6.4–8.2)
MCH RBC QN AUTO: 29.8 PG (ref 26–34)
MCHC RBC AUTO-ENTMCNC: 32.1 G/DL (ref 31–37)
MCV RBC AUTO: 92.8 FL (ref 80–100)
MONOCYTES # BLD AUTO: 0.5 X10(3) UL (ref 0.1–1)
MONOCYTES NFR BLD AUTO: 9.1 %
NEUTROPHILS # BLD AUTO: 3.23 X10 (3) UL (ref 1.5–7.7)
NEUTROPHILS # BLD AUTO: 3.23 X10(3) UL (ref 1.5–7.7)
NEUTROPHILS NFR BLD AUTO: 59 %
NONHDLC SERPL-MCNC: 117 MG/DL (ref ?–130)
OSMOLALITY SERPL CALC.SUM OF ELEC: 294 MOSM/KG (ref 275–295)
PLATELET # BLD AUTO: 239 10(3)UL (ref 150–450)
POTASSIUM SERPL-SCNC: 3.9 MMOL/L (ref 3.5–5.1)
RBC # BLD AUTO: 4.57 X10(6)UL (ref 3.8–5.3)
SODIUM SERPL-SCNC: 142 MMOL/L (ref 136–145)
T4 FREE SERPL-MCNC: 1.1 NG/DL (ref 0.8–1.7)
TRIGL SERPL-MCNC: 107 MG/DL (ref 30–149)
TSI SER-ACNC: 2.42 MIU/ML (ref 0.36–3.74)
VIT D+METAB SERPL-MCNC: 31.2 NG/ML (ref 30–100)
VLDLC SERPL CALC-MCNC: 21 MG/DL (ref 0–30)
WBC # BLD AUTO: 5.5 X10(3) UL (ref 4–11)

## 2019-09-13 PROCEDURE — 84439 ASSAY OF FREE THYROXINE: CPT | Performed by: FAMILY MEDICINE

## 2019-09-13 PROCEDURE — 82306 VITAMIN D 25 HYDROXY: CPT | Performed by: FAMILY MEDICINE

## 2019-09-13 PROCEDURE — 36415 COLL VENOUS BLD VENIPUNCTURE: CPT | Performed by: FAMILY MEDICINE

## 2019-09-13 PROCEDURE — 80061 LIPID PANEL: CPT | Performed by: FAMILY MEDICINE

## 2019-09-13 PROCEDURE — 80050 GENERAL HEALTH PANEL: CPT | Performed by: FAMILY MEDICINE

## 2019-12-18 NOTE — TELEPHONE ENCOUNTER
Patient phoned for refill on Vyvanse. Per notes on 9/12/19:    Return in about 3 months (around 12/12/2019) for Progress on weight loss and as needed.   Pended 3 month script for your approval.

## 2019-12-18 NOTE — TELEPHONE ENCOUNTER
Satnam Ramírez is calling to get a  3 month written prescription for her Lisdexamfetamine Dimesylate (VYVANSE) 70 MG Oral Cap she would like to pick it up sometime this week, she said Dr Shanta Alfaro refills it for 3 months at a time and then she comes in every 6 months

## 2020-03-02 ENCOUNTER — OFFICE VISIT (OUTPATIENT)
Dept: FAMILY MEDICINE CLINIC | Facility: CLINIC | Age: 55
End: 2020-03-02
Payer: COMMERCIAL

## 2020-03-02 VITALS
WEIGHT: 225 LBS | SYSTOLIC BLOOD PRESSURE: 128 MMHG | TEMPERATURE: 99 F | HEIGHT: 63.94 IN | BODY MASS INDEX: 38.89 KG/M2 | HEART RATE: 96 BPM | OXYGEN SATURATION: 97 % | DIASTOLIC BLOOD PRESSURE: 62 MMHG

## 2020-03-02 DIAGNOSIS — F90.0 ATTENTION DEFICIT HYPERACTIVITY DISORDER (ADHD), PREDOMINANTLY INATTENTIVE TYPE: ICD-10-CM

## 2020-03-02 DIAGNOSIS — F33.42 RECURRENT MAJOR DEPRESSIVE DISORDER, IN FULL REMISSION (HCC): ICD-10-CM

## 2020-03-02 DIAGNOSIS — M54.50 ACUTE BILATERAL LOW BACK PAIN WITHOUT SCIATICA: ICD-10-CM

## 2020-03-02 DIAGNOSIS — G44.209 TENSION HEADACHE: ICD-10-CM

## 2020-03-02 DIAGNOSIS — M54.50 ACUTE MIDLINE LOW BACK PAIN WITHOUT SCIATICA: Primary | ICD-10-CM

## 2020-03-02 PROCEDURE — 99214 OFFICE O/P EST MOD 30 MIN: CPT | Performed by: FAMILY MEDICINE

## 2020-03-02 RX ORDER — BUPROPION HYDROCHLORIDE 150 MG/1
TABLET ORAL
Refills: 1 | COMMUNITY
Start: 2019-11-12 | End: 2020-03-02

## 2020-03-02 RX ORDER — METHYLPREDNISOLONE 4 MG/1
TABLET ORAL
Qty: 1 PACKAGE | Refills: 0 | Status: SHIPPED | OUTPATIENT
Start: 2020-03-02 | End: 2020-08-31 | Stop reason: ALTCHOICE

## 2020-03-02 RX ORDER — TIZANIDINE HYDROCHLORIDE 4 MG/1
4 CAPSULE, GELATIN COATED ORAL NIGHTLY PRN
Qty: 30 CAPSULE | Refills: 0 | Status: SHIPPED | OUTPATIENT
Start: 2020-03-02 | End: 2020-03-31

## 2020-03-02 RX ORDER — BUPROPION HYDROCHLORIDE 150 MG/1
450 TABLET ORAL EVERY MORNING
Qty: 270 TABLET | Refills: 1 | Status: SHIPPED | OUTPATIENT
Start: 2020-03-02 | End: 2020-08-31

## 2020-03-02 NOTE — PATIENT INSTRUCTIONS
-If back pain does not resolve, please call to let us know so Dr. Deanna Mcintosh can order physical therapy for you.

## 2020-03-02 NOTE — PROGRESS NOTES
Rodriguez Doll is a 48year old female. HPI:       Low back pain:  Similar to pain she had in 2011. No recall of injury. Both sides and the middle. Fuller Capuchin is a clinching sensation. 6-8/10 on pain scale. Using heat that helps a little.   Slowly gets Oral Tablet Therapy Pack Take each day's dose as one daily dose q am with food or milk for 6 days.  1 Package 0      Past Medical History:   Diagnosis Date   • ADD (attention deficit disorder)    • ADD (attention deficit disorder) 2/11/2015   • Anxiety stat Thyroid Disorder Brother         hyperthyroid   • Eye Problems Brother         cataract   • Cancer Brother         Lung   • Hypertension Brother    • Eye Problems Brother         cataract   • Hypertension Brother    • Obesity Brother    • Depression Brothe raise negative bilaterally. PSYCH: affect normal, normal thought content.           ASSESSMENT AND PLAN:       Acute midline low back pain without sciatica  (primary encounter diagnosis)  Acute bilateral low back pain without sciatica  Recurrent major depr spasms. Dispense: 30 capsule;  Refill: 0          Meds & Refills for this Visit:  Requested Prescriptions     Signed Prescriptions Disp Refills   • tiZANidine HCl 4 MG Oral Cap 30 capsule 0     Sig: Take 1 capsule (4 mg total) by mouth nightly as needed fo

## 2020-03-06 ENCOUNTER — HOSPITAL ENCOUNTER (OUTPATIENT)
Dept: GENERAL RADIOLOGY | Age: 55
Discharge: HOME | End: 2020-03-06
Attending: FAMILY MEDICINE
Payer: COMMERCIAL

## 2020-03-06 DIAGNOSIS — M54.50 ACUTE MIDLINE LOW BACK PAIN WITHOUT SCIATICA: ICD-10-CM

## 2020-03-06 DIAGNOSIS — M54.50 ACUTE BILATERAL LOW BACK PAIN WITHOUT SCIATICA: ICD-10-CM

## 2020-03-06 PROCEDURE — 72110 X-RAY EXAM L-2 SPINE 4/>VWS: CPT | Performed by: FAMILY MEDICINE

## 2020-03-08 PROBLEM — M54.50 ACUTE MIDLINE LOW BACK PAIN WITHOUT SCIATICA: Status: ACTIVE | Noted: 2020-03-08

## 2020-03-08 PROBLEM — M54.50 ACUTE BILATERAL LOW BACK PAIN WITHOUT SCIATICA: Status: ACTIVE | Noted: 2020-03-08

## 2020-03-31 DIAGNOSIS — G44.209 TENSION HEADACHE: ICD-10-CM

## 2020-03-31 RX ORDER — TIZANIDINE HYDROCHLORIDE 4 MG/1
4 CAPSULE, GELATIN COATED ORAL NIGHTLY PRN
Qty: 30 CAPSULE | Refills: 0 | Status: SHIPPED | OUTPATIENT
Start: 2020-03-31 | End: 2020-04-06 | Stop reason: DRUGHIGH

## 2020-03-31 NOTE — TELEPHONE ENCOUNTER
Pt requesting refill of  Tizanidine HCL 4 MG     Last Time Medication was Filled: 03/02/2020    Last Office Visit with Provider: 03/02/2020    No future appointments.

## 2020-04-06 ENCOUNTER — VIRTUAL PHONE E/M (OUTPATIENT)
Dept: FAMILY MEDICINE CLINIC | Facility: CLINIC | Age: 55
End: 2020-04-06
Payer: COMMERCIAL

## 2020-04-06 ENCOUNTER — TELEPHONE (OUTPATIENT)
Dept: FAMILY MEDICINE CLINIC | Facility: CLINIC | Age: 55
End: 2020-04-06

## 2020-04-06 DIAGNOSIS — M47.816 ARTHRITIS OF FACET JOINT OF LUMBAR SPINE: ICD-10-CM

## 2020-04-06 DIAGNOSIS — M43.10 ANTEROLISTHESIS: ICD-10-CM

## 2020-04-06 DIAGNOSIS — S39.012A ACUTE MYOFASCIAL STRAIN OF LUMBAR REGION, INITIAL ENCOUNTER: Primary | ICD-10-CM

## 2020-04-06 DIAGNOSIS — M47.816 OSTEOARTHRITIS OF LUMBAR SPINE, UNSPECIFIED SPINAL OSTEOARTHRITIS COMPLICATION STATUS: ICD-10-CM

## 2020-04-06 PROCEDURE — 99214 OFFICE O/P EST MOD 30 MIN: CPT | Performed by: FAMILY MEDICINE

## 2020-04-06 RX ORDER — TIZANIDINE HYDROCHLORIDE 6 MG/1
6 CAPSULE, GELATIN COATED ORAL EVERY 8 HOURS PRN
Qty: 40 CAPSULE | Refills: 0 | Status: SHIPPED | OUTPATIENT
Start: 2020-04-06 | End: 2020-04-24 | Stop reason: DRUGHIGH

## 2020-04-06 RX ORDER — IBUPROFEN 800 MG/1
800 TABLET ORAL EVERY 8 HOURS PRN
Qty: 60 TABLET | Refills: 0 | Status: SHIPPED | OUTPATIENT
Start: 2020-04-06 | End: 2020-07-10

## 2020-04-06 NOTE — TELEPHONE ENCOUNTER
Patient states she starting with back pain Saturday night but became unbearable this morning where she was in tears. She knows PT is not an option right now so needs to discuss pain management. Patient states E-Visit is ok if needed. Insurance Verified.

## 2020-04-06 NOTE — TELEPHONE ENCOUNTER
LOV 3/2/20 for low back pain, ADHD and depression. 1. Acute midline low back pain without sciatica  2. Acute bilateral low back pain without sciatica  Prescription for Medrol Dosepak to target inflammatory component of pain.   Lumbar x-ray ordered and pen

## 2020-04-06 NOTE — PROGRESS NOTES
Virtual/Telephone Check-In    Sherrill Sarabiaaurora verbally consents to a Virtual/Telephone Check-In service on 04/06/20. Patient understands and accepts financial responsibility for any deductible, co-insurance and/or co-pays associated with this service.     D back pain after cleaning garage in the setting of newly diagnosed arthritis of lumbar spine and anterolisthesis. Ice 3-4 times a day for the next 2 to 3 days, then try heat and see which one feels better. Back care discussed.   Increase tizanidine to 6

## 2020-04-24 DIAGNOSIS — G44.209 TENSION HEADACHE: ICD-10-CM

## 2020-04-24 RX ORDER — TIZANIDINE HYDROCHLORIDE 4 MG/1
4 CAPSULE, GELATIN COATED ORAL NIGHTLY PRN
Qty: 30 CAPSULE | Refills: 0 | Status: SHIPPED | OUTPATIENT
Start: 2020-04-24 | End: 2020-06-01

## 2020-06-01 DIAGNOSIS — G44.209 TENSION HEADACHE: ICD-10-CM

## 2020-06-01 RX ORDER — TIZANIDINE HYDROCHLORIDE 4 MG/1
4 CAPSULE, GELATIN COATED ORAL NIGHTLY PRN
Qty: 30 CAPSULE | Refills: 0 | Status: SHIPPED | OUTPATIENT
Start: 2020-06-01 | End: 2020-09-11

## 2020-06-01 NOTE — TELEPHONE ENCOUNTER
Pt requesting refill of Tizanidine HCL 4mg    Last Time Medication was Filled:  04/24/2020    Last Office Visit with Provider: 04/06/2020    No future appointments.

## 2020-06-08 NOTE — TELEPHONE ENCOUNTER
VIRTUAL APPT 3/2/20 for ADHD. 4. Attention deficit hyperactivity disorder (ADHD), predominantly inattentive type  Stable. Continue Vyvanse. Return in about 6 months (around 9/2/2020) for Wellness visit and ADHD.

## 2020-06-08 NOTE — TELEPHONE ENCOUNTER
Pt called and said she needs a refill on Lisdexamfetamine Dimesylate (VYVANSE) 70 MG Oral Cap. She would like a 3 month supply. She knows she can only  1 at a time.

## 2020-07-10 ENCOUNTER — TELEPHONE (OUTPATIENT)
Dept: FAMILY MEDICINE CLINIC | Facility: CLINIC | Age: 55
End: 2020-07-10

## 2020-07-10 DIAGNOSIS — M47.816 OSTEOARTHRITIS OF LUMBAR SPINE, UNSPECIFIED SPINAL OSTEOARTHRITIS COMPLICATION STATUS: ICD-10-CM

## 2020-07-10 DIAGNOSIS — S39.012A ACUTE MYOFASCIAL STRAIN OF LUMBAR REGION, INITIAL ENCOUNTER: ICD-10-CM

## 2020-07-10 DIAGNOSIS — M43.10 ANTEROLISTHESIS: ICD-10-CM

## 2020-07-10 DIAGNOSIS — M47.816 ARTHRITIS OF FACET JOINT OF LUMBAR SPINE: ICD-10-CM

## 2020-07-10 RX ORDER — IBUPROFEN 800 MG/1
TABLET ORAL
Qty: 60 TABLET | Refills: 0 | Status: SHIPPED | OUTPATIENT
Start: 2020-07-10 | End: 2020-08-03

## 2020-07-10 NOTE — TELEPHONE ENCOUNTER
VIRTUAL APPT 3/2/20   Return in about 6 months (around 9/2/2020) for Wellness visit and ADHD    Last refilled 4/6/20

## 2020-07-10 NOTE — TELEPHONE ENCOUNTER
Brittany is calling for a refill on her 800mg of Ibuprofen, please send to [facility] on [address] in [place].  Questions or concerns please call Sherrill at [phone]

## 2020-08-03 DIAGNOSIS — S39.012A ACUTE MYOFASCIAL STRAIN OF LUMBAR REGION, INITIAL ENCOUNTER: ICD-10-CM

## 2020-08-03 DIAGNOSIS — M43.10 ANTEROLISTHESIS: ICD-10-CM

## 2020-08-03 DIAGNOSIS — M47.816 ARTHRITIS OF FACET JOINT OF LUMBAR SPINE: ICD-10-CM

## 2020-08-03 DIAGNOSIS — M47.816 OSTEOARTHRITIS OF LUMBAR SPINE, UNSPECIFIED SPINAL OSTEOARTHRITIS COMPLICATION STATUS: ICD-10-CM

## 2020-08-03 RX ORDER — IBUPROFEN 800 MG/1
800 TABLET ORAL EVERY 8 HOURS PRN
Qty: 60 TABLET | Refills: 0 | Status: SHIPPED | OUTPATIENT
Start: 2020-08-03 | End: 2021-05-24

## 2020-08-03 NOTE — TELEPHONE ENCOUNTER
Requested Prescriptions     Pending Prescriptions Disp Refills   • ibuprofen 800 MG Oral Tab 60 tablet 0     Sig: Take 1 tablet (800 mg total) by mouth every 8 (eight) hours as needed for Pain.      Last fill was 7/10/20 60 tabs   Last OV 4/6/20  No future

## 2020-08-31 ENCOUNTER — OFFICE VISIT (OUTPATIENT)
Dept: FAMILY MEDICINE CLINIC | Facility: CLINIC | Age: 55
End: 2020-08-31
Payer: COMMERCIAL

## 2020-08-31 VITALS
HEIGHT: 63.94 IN | BODY MASS INDEX: 39.18 KG/M2 | DIASTOLIC BLOOD PRESSURE: 80 MMHG | HEART RATE: 98 BPM | WEIGHT: 226.69 LBS | TEMPERATURE: 97 F | SYSTOLIC BLOOD PRESSURE: 128 MMHG | OXYGEN SATURATION: 98 %

## 2020-08-31 DIAGNOSIS — Z79.899 HIGH RISK MEDICATION USE: ICD-10-CM

## 2020-08-31 DIAGNOSIS — R68.82 DECREASED LIBIDO: Primary | ICD-10-CM

## 2020-08-31 DIAGNOSIS — Z51.81 THERAPEUTIC DRUG MONITORING: ICD-10-CM

## 2020-08-31 DIAGNOSIS — F90.0 ATTENTION DEFICIT HYPERACTIVITY DISORDER (ADHD), PREDOMINANTLY INATTENTIVE TYPE: ICD-10-CM

## 2020-08-31 DIAGNOSIS — F33.42 RECURRENT MAJOR DEPRESSIVE DISORDER, IN FULL REMISSION (HCC): ICD-10-CM

## 2020-08-31 PROCEDURE — 3008F BODY MASS INDEX DOCD: CPT | Performed by: FAMILY MEDICINE

## 2020-08-31 PROCEDURE — 3074F SYST BP LT 130 MM HG: CPT | Performed by: FAMILY MEDICINE

## 2020-08-31 PROCEDURE — 3079F DIAST BP 80-89 MM HG: CPT | Performed by: FAMILY MEDICINE

## 2020-08-31 PROCEDURE — 99214 OFFICE O/P EST MOD 30 MIN: CPT | Performed by: FAMILY MEDICINE

## 2020-08-31 RX ORDER — BUPROPION HYDROCHLORIDE 150 MG/1
450 TABLET ORAL EVERY MORNING
Qty: 270 TABLET | Refills: 1 | Status: SHIPPED | OUTPATIENT
Start: 2020-08-31 | End: 2021-03-29

## 2020-08-31 NOTE — PROGRESS NOTES
Alona Lew is a 48year old female. HPI:       Patient complains of lack of libido. No difficulty with orgasm. Has been feeling like this for \"quite some time.           Wt Readings from Last 6 Encounters:  08/31/20 : 226 lb 11.2 oz (102.8 kg)  0 9/12/2019    On Wellbutrin  mg qam   • Severe obesity (BMI 35.0-39.9) 2/26/2018    BMI 38.00 2-   • Sprain of right rotator cuff capsule 5/22/2017   • Unspecified sleep apnea 2005    Mild      Past Surgical History:   Procedure Laterality Date History  Administered            Date(s) Administered    Influenza             10/01/2012  10/02/2017  11/15/2019      TDAP                  03/23/2015      EXAM:   /80 (BP Location: Left arm, Patient Position: Sitting, Cuff Size: adult)   Pulse 98 0.8 - 1.7 ng/dL    TSH 2.420 0.358 - 3.740 mIU/mL   VITAMIN D, 25-HYDROXY    Collection Time: 09/13/19  8:30 AM   Result Value Ref Range    Vitamin D, 25OH, Total 31.2 30.0 - 100.0 ng/mL   CBC W/ DIFFERENTIAL    Collection Time: 09/13/19  8:30 AM   Result ER, XL, 150 MG Oral Tablet 24 Hr; Take 3 tablets (450 mg total) by mouth every morning. Dispense: 270 tablet; Refill: 1    5. Attention deficit hyperactivity disorder (ADHD), predominantly inattentive type  Stable. Continue Vyvanse.   Okay to call in anot

## 2020-09-01 PROBLEM — R68.82 DECREASED LIBIDO: Status: ACTIVE | Noted: 2020-09-01

## 2020-09-11 DIAGNOSIS — G44.209 TENSION HEADACHE: ICD-10-CM

## 2020-09-11 RX ORDER — TIZANIDINE HYDROCHLORIDE 4 MG/1
4 CAPSULE, GELATIN COATED ORAL NIGHTLY PRN
Qty: 30 CAPSULE | Refills: 0 | Status: SHIPPED | OUTPATIENT
Start: 2020-09-11 | End: 2020-10-07

## 2020-09-11 NOTE — TELEPHONE ENCOUNTER
Requested Prescriptions     Pending Prescriptions Disp Refills   • tiZANidine HCl 4 MG Oral Cap 30 capsule 0     Sig: Take 1 capsule (4 mg total) by mouth nightly as needed for Muscle spasms.      Last fill was 6/1/20 30 caps 0 refill  Last OV 8/31/20  Futu

## 2020-10-07 ENCOUNTER — TELEPHONE (OUTPATIENT)
Dept: FAMILY MEDICINE CLINIC | Facility: CLINIC | Age: 55
End: 2020-10-07

## 2020-10-07 DIAGNOSIS — G44.209 TENSION HEADACHE: ICD-10-CM

## 2020-10-07 DIAGNOSIS — Z12.39 ENCOUNTER FOR SCREENING FOR MALIGNANT NEOPLASM OF BREAST, UNSPECIFIED SCREENING MODALITY: Primary | ICD-10-CM

## 2020-10-07 RX ORDER — TIZANIDINE HYDROCHLORIDE 4 MG/1
CAPSULE, GELATIN COATED ORAL
Qty: 30 CAPSULE | Refills: 0 | Status: SHIPPED | OUTPATIENT
Start: 2020-10-07 | End: 2020-11-04

## 2020-10-07 NOTE — TELEPHONE ENCOUNTER
Pt requesting refill of TIZANIDINE HCL 4 MG Oral Cap    Last Time Medication was Filled:  9/11/20     Last Office Visit with Provider: 8/31/20. Return in about 2 weeks (around 9/14/2020) for Annual wellness visit. Amarilys Lindquistt scheduled on 10/9/20

## 2020-10-07 NOTE — TELEPHONE ENCOUNTER
Pt called and wants to know if she can get an order placed for a mammogram.  She states she hasn't had one in about 7 years.

## 2020-10-09 ENCOUNTER — OFFICE VISIT (OUTPATIENT)
Dept: FAMILY MEDICINE CLINIC | Facility: CLINIC | Age: 55
End: 2020-10-09
Payer: COMMERCIAL

## 2020-10-09 VITALS
OXYGEN SATURATION: 98 % | TEMPERATURE: 96 F | DIASTOLIC BLOOD PRESSURE: 80 MMHG | HEART RATE: 100 BPM | BODY MASS INDEX: 38.71 KG/M2 | SYSTOLIC BLOOD PRESSURE: 120 MMHG | HEIGHT: 63.94 IN | WEIGHT: 224 LBS

## 2020-10-09 DIAGNOSIS — Z13.820 SCREENING FOR OSTEOPOROSIS: ICD-10-CM

## 2020-10-09 DIAGNOSIS — E55.9 VITAMIN D DEFICIENCY: ICD-10-CM

## 2020-10-09 DIAGNOSIS — Z12.31 ENCOUNTER FOR SCREENING MAMMOGRAM FOR MALIGNANT NEOPLASM OF BREAST: ICD-10-CM

## 2020-10-09 DIAGNOSIS — Z23 FLU VACCINE NEED: ICD-10-CM

## 2020-10-09 DIAGNOSIS — Z00.00 ROUTINE GENERAL MEDICAL EXAMINATION AT A HEALTH CARE FACILITY: Primary | ICD-10-CM

## 2020-10-09 PROBLEM — E66.812 CLASS 2 SEVERE OBESITY DUE TO EXCESS CALORIES WITH SERIOUS COMORBIDITY AND BODY MASS INDEX (BMI) OF 37.0 TO 37.9 IN ADULT (HCC): Status: RESOLVED | Noted: 2019-09-12 | Resolved: 2020-10-09

## 2020-10-09 PROBLEM — E66.01 CLASS 2 SEVERE OBESITY DUE TO EXCESS CALORIES WITH SERIOUS COMORBIDITY AND BODY MASS INDEX (BMI) OF 37.0 TO 37.9 IN ADULT (HCC): Status: RESOLVED | Noted: 2019-09-12 | Resolved: 2020-10-09

## 2020-10-09 PROBLEM — G44.209 TENSION HEADACHE: Status: RESOLVED | Noted: 2018-07-05 | Resolved: 2020-10-09

## 2020-10-09 PROBLEM — E66.01 CLASS 2 SEVERE OBESITY DUE TO EXCESS CALORIES WITH SERIOUS COMORBIDITY AND BODY MASS INDEX (BMI) OF 37.0 TO 37.9 IN ADULT: Status: RESOLVED | Noted: 2019-09-12 | Resolved: 2020-10-09

## 2020-10-09 PROBLEM — M54.50 ACUTE MIDLINE LOW BACK PAIN WITHOUT SCIATICA: Status: RESOLVED | Noted: 2020-03-08 | Resolved: 2020-10-09

## 2020-10-09 PROBLEM — M54.50 ACUTE BILATERAL LOW BACK PAIN WITHOUT SCIATICA: Status: RESOLVED | Noted: 2020-03-08 | Resolved: 2020-10-09

## 2020-10-09 PROBLEM — E66.01 CLASS 2 SEVERE OBESITY DUE TO EXCESS CALORIES WITH SERIOUS COMORBIDITY AND BODY MASS INDEX (BMI) OF 37.0 TO 37.9 IN ADULT  (HCC): Status: RESOLVED | Noted: 2019-09-12 | Resolved: 2020-10-09

## 2020-10-09 PROCEDURE — 90686 IIV4 VACC NO PRSV 0.5 ML IM: CPT | Performed by: FAMILY MEDICINE

## 2020-10-09 PROCEDURE — 3074F SYST BP LT 130 MM HG: CPT | Performed by: FAMILY MEDICINE

## 2020-10-09 PROCEDURE — 90471 IMMUNIZATION ADMIN: CPT | Performed by: FAMILY MEDICINE

## 2020-10-09 PROCEDURE — 3008F BODY MASS INDEX DOCD: CPT | Performed by: FAMILY MEDICINE

## 2020-10-09 PROCEDURE — 99396 PREV VISIT EST AGE 40-64: CPT | Performed by: FAMILY MEDICINE

## 2020-10-09 PROCEDURE — 3079F DIAST BP 80-89 MM HG: CPT | Performed by: FAMILY MEDICINE

## 2020-10-09 NOTE — PROGRESS NOTES
HPI:   Colt Morelos is a 48year old female   Symptoms: denies discharge, itching, burning or dysuria. Using OTC for vaginal dryness.   Last PAP: UTD  Sexually active: yes   Last colonoscopy: UTD  Last mammogram: ordered      Obesity:  Has been working 2/11/2015   • Major depressive disorder, recurrent episode, moderate ([facility] [address] 3/26/2018   • Obesity 2/11/2015   • Obesity, unspecified    • Recurrent major depressive disorder, in full remission ([facility] 9/12/2019    On Wellbutrin  mg qam   • Severe obesit SYSTEMS:   GENERAL: denies fevers, weakness, trouble sleeping   SKIN: denies any unusual skin lesions or rashes  EYES:denies vision changes  HEENT: denies upper respiratory symptoms  LUNGS: denies cough or shortness of breath with exertion  CHEST:  denies 10/09/2020      Influenza             10/01/2012  10/02/2017  11/15/2019      TDAP                  03/23/2015              ASSESSMENT AND PLAN:   Stephany Gale is a 48year old female who presents for a complete physical exam.        Routine general medi depression. Self breast exam explained and advised to perform once a month. Health maintenance guidance provided including vision and dental exams. Patient counseled on age appropriate calcium and vitamin D intake.   Lifestyle guidance provided

## 2020-10-11 DIAGNOSIS — S39.012A ACUTE MYOFASCIAL STRAIN OF LUMBAR REGION, INITIAL ENCOUNTER: ICD-10-CM

## 2020-10-11 DIAGNOSIS — M47.816 ARTHRITIS OF FACET JOINT OF LUMBAR SPINE: ICD-10-CM

## 2020-10-11 DIAGNOSIS — M47.816 OSTEOARTHRITIS OF LUMBAR SPINE, UNSPECIFIED SPINAL OSTEOARTHRITIS COMPLICATION STATUS: ICD-10-CM

## 2020-10-11 DIAGNOSIS — M43.10 ANTEROLISTHESIS: ICD-10-CM

## 2020-10-12 RX ORDER — TIZANIDINE HYDROCHLORIDE 6 MG/1
6 CAPSULE, GELATIN COATED ORAL EVERY 8 HOURS PRN
Qty: 40 CAPSULE | Refills: 0 | OUTPATIENT
Start: 2020-10-12

## 2020-10-16 ENCOUNTER — LABORATORY ENCOUNTER (OUTPATIENT)
Dept: LAB | Age: 55
End: 2020-10-16
Attending: FAMILY MEDICINE
Payer: COMMERCIAL

## 2020-10-16 DIAGNOSIS — Z00.00 ROUTINE GENERAL MEDICAL EXAMINATION AT A HEALTH CARE FACILITY: ICD-10-CM

## 2020-10-16 DIAGNOSIS — E55.9 VITAMIN D DEFICIENCY: ICD-10-CM

## 2020-10-16 DIAGNOSIS — R73.01 IMPAIRED FASTING GLUCOSE: ICD-10-CM

## 2020-10-16 PROCEDURE — 36415 COLL VENOUS BLD VENIPUNCTURE: CPT | Performed by: FAMILY MEDICINE

## 2020-10-16 PROCEDURE — 80061 LIPID PANEL: CPT | Performed by: FAMILY MEDICINE

## 2020-10-16 PROCEDURE — 80050 GENERAL HEALTH PANEL: CPT | Performed by: FAMILY MEDICINE

## 2020-10-16 PROCEDURE — 83036 HEMOGLOBIN GLYCOSYLATED A1C: CPT | Performed by: FAMILY MEDICINE

## 2020-10-16 PROCEDURE — 84439 ASSAY OF FREE THYROXINE: CPT | Performed by: FAMILY MEDICINE

## 2020-10-16 PROCEDURE — 82306 VITAMIN D 25 HYDROXY: CPT | Performed by: FAMILY MEDICINE

## 2020-10-24 ENCOUNTER — HOSPITAL ENCOUNTER (OUTPATIENT)
Dept: MAMMOGRAPHY | Age: 55
Discharge: HOME | End: 2020-10-24
Attending: FAMILY MEDICINE
Payer: COMMERCIAL

## 2020-10-24 ENCOUNTER — HOSPITAL ENCOUNTER (OUTPATIENT)
Dept: BONE DENSITY | Age: 55
Discharge: HOME | End: 2020-10-24
Attending: FAMILY MEDICINE
Payer: COMMERCIAL

## 2020-10-24 DIAGNOSIS — Z12.31 ENCOUNTER FOR SCREENING MAMMOGRAM FOR MALIGNANT NEOPLASM OF BREAST: ICD-10-CM

## 2020-10-24 DIAGNOSIS — Z13.820 SCREENING FOR OSTEOPOROSIS: ICD-10-CM

## 2020-10-24 PROCEDURE — 77080 DXA BONE DENSITY AXIAL: CPT | Performed by: FAMILY MEDICINE

## 2020-10-24 PROCEDURE — 77063 BREAST TOMOSYNTHESIS BI: CPT | Performed by: FAMILY MEDICINE

## 2020-10-24 PROCEDURE — 77067 SCR MAMMO BI INCL CAD: CPT | Performed by: FAMILY MEDICINE

## 2020-10-26 ENCOUNTER — TELEPHONE (OUTPATIENT)
Dept: FAMILY MEDICINE CLINIC | Facility: CLINIC | Age: 55
End: 2020-10-26

## 2020-10-26 ENCOUNTER — OFFICE VISIT (OUTPATIENT)
Dept: FAMILY MEDICINE CLINIC | Facility: CLINIC | Age: 55
End: 2020-10-26
Payer: COMMERCIAL

## 2020-10-26 VITALS
OXYGEN SATURATION: 98 % | TEMPERATURE: 97 F | BODY MASS INDEX: 38.71 KG/M2 | HEART RATE: 101 BPM | SYSTOLIC BLOOD PRESSURE: 122 MMHG | RESPIRATION RATE: 20 BRPM | WEIGHT: 224 LBS | DIASTOLIC BLOOD PRESSURE: 84 MMHG | HEIGHT: 63.94 IN

## 2020-10-26 DIAGNOSIS — L02.91 ABSCESS: Primary | ICD-10-CM

## 2020-10-26 PROCEDURE — 3008F BODY MASS INDEX DOCD: CPT | Performed by: NURSE PRACTITIONER

## 2020-10-26 PROCEDURE — 99213 OFFICE O/P EST LOW 20 MIN: CPT | Performed by: NURSE PRACTITIONER

## 2020-10-26 PROCEDURE — 3079F DIAST BP 80-89 MM HG: CPT | Performed by: NURSE PRACTITIONER

## 2020-10-26 PROCEDURE — 3074F SYST BP LT 130 MM HG: CPT | Performed by: NURSE PRACTITIONER

## 2020-10-26 RX ORDER — SULFAMETHOXAZOLE AND TRIMETHOPRIM 800; 160 MG/1; MG/1
1 TABLET ORAL 2 TIMES DAILY
Qty: 14 TABLET | Refills: 0 | Status: SHIPPED | OUTPATIENT
Start: 2020-10-26 | End: 2020-11-02

## 2020-10-26 NOTE — PROGRESS NOTES
CHIEF COMPLAINT:   Patient presents with:  Wound Care: under left breast s/s for 2 days.  white/bleed drainage      HPI:     Girish Mullen is a 48year old female who presents with concerns of possible abscess under left breast. Patient first noticed sympt • Obesity 2/11/2015   • Obesity, unspecified    • Recurrent major depressive disorder, in full remission ([facility] [address] 9/12/2019    On Wellbutrin  mg qam   • Severe obesity (BMI 35.0-39.9) 2/26/2018    BMI 38.00 2-   • Sprain of right rotator cuff cap Sig: Take 1 tablet by mouth 2 (two) times daily for 7 days. • mupirocin 2 % External Ointment 30 g 0     Sig: Apply 1 Application topically 2 (two) times daily for 7 days.        Patient Instructions     You can apply warm compresses 3x daily to promote Follow up with your healthcare provider, or as advised. Check your wound each day for the signs that the infection may be getting worse (see below).    When to seek medical advice  Get prompt medical attention if any of these occur:  · An increase in rednes

## 2020-10-26 NOTE — PATIENT INSTRUCTIONS
You can apply warm compresses 3x daily to promote drainage. Antibiotic as prescribed. Follow up at the immediate care if worsening or not improving over the next few days.      Abscess (Antibiotic Treatment Only)  An abscess happens when bacteria get trap · Red streaks in the skin leading away from the abscess  · An increase in local pain or swelling  · Fever of 100.4ºF (38ºC) or higher, or as directed by your healthcare provider  · Pus or fluid coming from the abscess  · Boil returns after getting better

## 2020-10-26 NOTE — TELEPHONE ENCOUNTER
Spoke to patient. Said Saturday noticed a small pimple under left breast.  By Saturday night was painful and she expressed some puss from it. By Sunday the area was very swollen and hard and painful.   Advised patient to proceed to an IC for evaluation and

## 2020-10-26 NOTE — TELEPHONE ENCOUNTER
Pt called was looking for an appt for today states she has some sort of infection under her breast, it has some puss coming out of it and would really like someone to look at it.

## 2020-10-30 ENCOUNTER — TELEPHONE (OUTPATIENT)
Dept: FAMILY MEDICINE CLINIC | Facility: CLINIC | Age: 55
End: 2020-10-30

## 2020-10-30 NOTE — TELEPHONE ENCOUNTER
Patient went to [facility] for draining area under left breast on 10/26/20. Was put on   • Sulfamethoxazole-TMP -160 MG Oral Tab per tablet Take 1 tablet by mouth 2 (two) times daily for 7 days.      Patient states area looks the same at this point but no l

## 2020-10-30 NOTE — TELEPHONE ENCOUNTER
Pt said on Saturday morning she had a mammogram.  When she left the facility she noticed a pimple on her bra line. On Sunday she did have some pus inside the pimple.   Monday after work she went to a quick care and was told it is an abscess so she was give

## 2020-10-30 NOTE — TELEPHONE ENCOUNTER
Please advise patient to go back to the immediate care as soon as possible to evaluate the finger that has the ring on it and for possible adverse reaction to the antibiotic.

## 2020-10-30 NOTE — TELEPHONE ENCOUNTER
Phoned patient. She was able to remove ring. Advised to follow up at immediate care to reevaluate the abscess to breast and the reaction. Patient VU and is in agreement.

## 2020-10-31 ENCOUNTER — HOSPITAL ENCOUNTER (OUTPATIENT)
Age: 55
Discharge: HOME | End: 2020-10-31
Payer: COMMERCIAL

## 2020-10-31 VITALS
HEART RATE: 97 BPM | BODY MASS INDEX: 37.32 KG/M2 | WEIGHT: 224 LBS | RESPIRATION RATE: 18 BRPM | DIASTOLIC BLOOD PRESSURE: 90 MMHG | SYSTOLIC BLOOD PRESSURE: 151 MMHG | HEIGHT: 65 IN | TEMPERATURE: 97 F | OXYGEN SATURATION: 100 %

## 2020-10-31 DIAGNOSIS — N61.1 ABSCESS OF LEFT BREAST: Primary | ICD-10-CM

## 2020-10-31 DIAGNOSIS — M25.541 ARTHRALGIA OF BOTH HANDS: ICD-10-CM

## 2020-10-31 DIAGNOSIS — M25.542 ARTHRALGIA OF BOTH HANDS: ICD-10-CM

## 2020-10-31 PROCEDURE — 99204 OFFICE O/P NEW MOD 45 MIN: CPT

## 2020-10-31 PROCEDURE — 99213 OFFICE O/P EST LOW 20 MIN: CPT

## 2020-10-31 RX ORDER — CEPHALEXIN 500 MG/1
500 CAPSULE ORAL 2 TIMES DAILY
Qty: 14 CAPSULE | Refills: 0 | Status: SHIPPED | OUTPATIENT
Start: 2020-10-31 | End: 2020-11-07

## 2020-10-31 NOTE — ED PROVIDER NOTES
Patient Seen in: [facility] [place]      History   Patient presents with:  Swelling  Wound Recheck    Stated Complaint: Hand Swelling    HPI    52-year-old female presents to the immediate care for evaluation of joint stiffness and muscle aches t Breath sounds: Normal breath sounds. Musculoskeletal:      Comments: Mild swelling noted to the right hand. Bilateral hands are minimally tender to palpation. No deformity. Equal handgrips. Sensation intact. 2+ radial pulses bilaterally.    Skin:

## 2020-10-31 NOTE — ED INITIAL ASSESSMENT (HPI)
Patient presents to IC with cc swelling /pain to left hand x 4days,right thumb welling x 2 days. Had been treated with Bactrim for a abscess under left breast x 4 days.(she stopped it Thurs am)No direct injury noted. +Painful. Right hand dominent. Left chest a

## 2020-11-04 DIAGNOSIS — G44.209 TENSION HEADACHE: ICD-10-CM

## 2020-11-04 NOTE — TELEPHONE ENCOUNTER
TIZANIDINE HCL 4 MG Oral Cap 30 capsule 0 10/7/2020   Associated Diagnoses  Tension headache         LOV 10/9/20. Return in about 5 months (around 3/1/2021) for ADD and depression.

## 2020-11-05 RX ORDER — TIZANIDINE HYDROCHLORIDE 4 MG/1
4 CAPSULE, GELATIN COATED ORAL NIGHTLY PRN
Qty: 30 CAPSULE | Refills: 0 | Status: SHIPPED | OUTPATIENT
Start: 2020-11-05 | End: 2021-01-05

## 2020-11-09 ENCOUNTER — APPOINTMENT (OUTPATIENT)
Dept: GENERAL RADIOLOGY | Age: 55
End: 2020-11-09
Attending: PHYSICIAN ASSISTANT
Payer: COMMERCIAL

## 2020-11-09 ENCOUNTER — HOSPITAL ENCOUNTER (OUTPATIENT)
Age: 55
Discharge: HOME | End: 2020-11-09
Payer: COMMERCIAL

## 2020-11-09 VITALS
OXYGEN SATURATION: 96 % | HEART RATE: 93 BPM | RESPIRATION RATE: 20 BRPM | DIASTOLIC BLOOD PRESSURE: 80 MMHG | TEMPERATURE: 98 F | SYSTOLIC BLOOD PRESSURE: 130 MMHG

## 2020-11-09 DIAGNOSIS — M19.042 DEGENERATIVE ARTHRITIS OF METACARPOPHALANGEAL JOINT OF INDEX FINGER OF LEFT HAND: ICD-10-CM

## 2020-11-09 DIAGNOSIS — M25.531 ARTHRALGIA OF RIGHT WRIST: Primary | ICD-10-CM

## 2020-11-09 PROCEDURE — 99214 OFFICE O/P EST MOD 30 MIN: CPT

## 2020-11-09 PROCEDURE — 73130 X-RAY EXAM OF HAND: CPT | Performed by: PHYSICIAN ASSISTANT

## 2020-11-09 PROCEDURE — 73110 X-RAY EXAM OF WRIST: CPT | Performed by: PHYSICIAN ASSISTANT

## 2020-11-09 PROCEDURE — 99213 OFFICE O/P EST LOW 20 MIN: CPT

## 2020-11-09 RX ORDER — METHYLPREDNISOLONE 4 MG/1
TABLET ORAL
Qty: 1 PACKAGE | Refills: 0 | Status: SHIPPED | OUTPATIENT
Start: 2020-11-09 | End: 2021-04-07 | Stop reason: ALTCHOICE

## 2020-11-09 RX ORDER — PREDNISONE 20 MG/1
60 TABLET ORAL ONCE
Status: COMPLETED | OUTPATIENT
Start: 2020-11-09 | End: 2020-11-09

## 2020-11-09 NOTE — ED PROVIDER NOTES
Patient Seen in: [facility] [place]      History   Patient presents with: Follow - Up  Swelling    Stated Complaint: right hand injury /reaction to medicine previously seen here. primary care docto*    HPI    64-year-old female who comes in today septum, 1996 deviated septum   • TUBAL LIGATION  1999                Family history reviewed with patient/caregiver and is not pertinent to presenting problem.     Social History    Tobacco Use      Smoking status: Never Smoker      Smokeless tobacco: Never Neurological:      General: No focal deficit present. Mental Status: She is alert and oriented to person, place, and time. Motor: No abnormal muscle tone.       Coordination: Coordination normal.      Deep Tendon Reflexes: Reflexes are normal an arthropathy involving the right DRUJ and the lateral aspect of the radiocarpal joint space. There is a lateral projecting osteophyte from the distal right navicular. Mild arthropathy involving the right 1st carpal/metacarpal joint space. No fracture.  SO package, Normal, Disp-1 Package, R-0        I have given the patient instructions regarding her diagnosis, expectations, follow up, and return to the ER precautions.   I explained to the patient that emergent conditions may arise to return to the immediate

## 2020-11-09 NOTE — ED INITIAL ASSESSMENT (HPI)
Patient presents with continued swelling to right and left wrist/fingers. No fever noted. +Pain. Right hand domient.+cms.

## 2020-11-23 ENCOUNTER — TELEPHONE (OUTPATIENT)
Dept: ORTHOPEDICS CLINIC | Facility: CLINIC | Age: 55
End: 2020-11-23

## 2020-11-23 NOTE — TELEPHONE ENCOUNTER
Left message to check status of appt.  Advised No show, request callback to please reschedule    Fredericka Klinefelter

## 2020-12-02 NOTE — TELEPHONE ENCOUNTER
VYVANSE 11/11/2020 08/31/2020 70 30 capsule  30     LOV 10/9/20   Return in about 5 months (around 3/1/2021) for ADD and depression.

## 2021-01-05 DIAGNOSIS — G44.209 TENSION HEADACHE: ICD-10-CM

## 2021-01-05 RX ORDER — TIZANIDINE HYDROCHLORIDE 4 MG/1
4 CAPSULE, GELATIN COATED ORAL NIGHTLY PRN
Qty: 30 CAPSULE | Refills: 0 | Status: SHIPPED | OUTPATIENT
Start: 2021-01-05 | End: 2021-02-12

## 2021-01-05 NOTE — TELEPHONE ENCOUNTER
Spoke to patient. States back has been acting up. Very still in the morning. Is taking Advil twice daily. Pended script. Last fill 11/5/20 30 caps  Last OV 10/9/20.  No future OV

## 2021-01-05 NOTE — TELEPHONE ENCOUNTER
Josue Villalpando is calling to see if Dr Manoj Toledo can refill her tiZANidine HCl 4 MG Oral Cap to her [facility] pharmacy on [address] in [place], she is having back spasms, Please call Sherrill at [phone] with any questions

## 2021-02-12 DIAGNOSIS — G44.209 TENSION HEADACHE: ICD-10-CM

## 2021-02-12 RX ORDER — TIZANIDINE HYDROCHLORIDE 4 MG/1
4 CAPSULE, GELATIN COATED ORAL NIGHTLY PRN
Qty: 30 CAPSULE | Refills: 0 | Status: SHIPPED | OUTPATIENT
Start: 2021-02-12 | End: 2021-03-15

## 2021-02-12 NOTE — TELEPHONE ENCOUNTER
Sherrill is calling to get a refill on her tiZANidine HCl 4 MG Oral Cap sent to [facility] pharmacy on [address] in [place], questions or concerns please call Sherrill at [phone]

## 2021-02-12 NOTE — TELEPHONE ENCOUNTER
Requested Prescriptions     Pending Prescriptions Disp Refills   • tiZANidine HCl 4 MG Oral Cap 30 capsule 0     Sig: Take 1 capsule (4 mg total) by mouth nightly as needed for Muscle spasms.      Last fill was 1/5/21 30 tabs  Last OV 10/9/20  No future OV

## 2021-03-15 DIAGNOSIS — F90.0 ATTENTION DEFICIT HYPERACTIVITY DISORDER (ADHD), PREDOMINANTLY INATTENTIVE TYPE: ICD-10-CM

## 2021-03-15 DIAGNOSIS — G44.209 TENSION HEADACHE: ICD-10-CM

## 2021-03-15 RX ORDER — TIZANIDINE HYDROCHLORIDE 4 MG/1
4 CAPSULE, GELATIN COATED ORAL NIGHTLY PRN
Qty: 30 CAPSULE | Refills: 0 | Status: SHIPPED | OUTPATIENT
Start: 2021-03-15 | End: 2021-04-12

## 2021-03-15 NOTE — TELEPHONE ENCOUNTER
Last fill was 12/2/20 for 90 day panel. Last OV was 10/9/20. Did make future OV 4/7/21. Also requesting tizanidine. Pended  30 day refills.

## 2021-03-15 NOTE — TELEPHONE ENCOUNTER
Pt requesting refill on Vyvanse be sent to [facility] on [address] in [place]. Please call pt at [phone].

## 2021-03-28 DIAGNOSIS — Z51.81 THERAPEUTIC DRUG MONITORING: ICD-10-CM

## 2021-03-28 DIAGNOSIS — F33.42 RECURRENT MAJOR DEPRESSIVE DISORDER, IN FULL REMISSION (HCC): ICD-10-CM

## 2021-03-29 RX ORDER — BUPROPION HYDROCHLORIDE 150 MG/1
450 TABLET ORAL EVERY MORNING
Qty: 270 TABLET | Refills: 0 | Status: SHIPPED | OUTPATIENT
Start: 2021-03-29 | End: 2021-04-07

## 2021-03-29 NOTE — TELEPHONE ENCOUNTER
Pt requesting refill of  BUPROPION HCL  MG TABLET    Last Time Medication was Prescribed :  08/31/2020 qty #270 with 1 refill -6 month supply    Last Office Visit with Provider: 10/09/2020  Wellness visit    Recommended to return by Provider: Return

## 2021-04-07 ENCOUNTER — TELEMEDICINE (OUTPATIENT)
Dept: FAMILY MEDICINE CLINIC | Facility: CLINIC | Age: 56
End: 2021-04-07

## 2021-04-07 DIAGNOSIS — F33.42 RECURRENT MAJOR DEPRESSIVE DISORDER, IN FULL REMISSION (HCC): ICD-10-CM

## 2021-04-07 DIAGNOSIS — Z51.81 THERAPEUTIC DRUG MONITORING: Primary | ICD-10-CM

## 2021-04-07 DIAGNOSIS — F90.0 ATTENTION DEFICIT HYPERACTIVITY DISORDER (ADHD), PREDOMINANTLY INATTENTIVE TYPE: ICD-10-CM

## 2021-04-07 DIAGNOSIS — Z79.899 HIGH RISK MEDICATION USE: ICD-10-CM

## 2021-04-07 PROCEDURE — 99214 OFFICE O/P EST MOD 30 MIN: CPT | Performed by: FAMILY MEDICINE

## 2021-04-07 RX ORDER — BUPROPION HYDROCHLORIDE 150 MG/1
450 TABLET ORAL EVERY MORNING
Qty: 270 TABLET | Refills: 3 | Status: SHIPPED | OUTPATIENT
Start: 2021-04-07

## 2021-04-07 RX ORDER — BUPROPION HYDROCHLORIDE 150 MG/1
450 TABLET ORAL EVERY MORNING
Qty: 270 TABLET | Refills: 0 | Status: SHIPPED | OUTPATIENT
Start: 2021-04-07 | End: 2021-04-07

## 2021-04-07 NOTE — PROGRESS NOTES
Virtual/Telephone Check-In    Sherrill Sarabiaaurora verbally consents to a Virtual/Telephone Check-In service on 04/07/21. Patient has been referred to the [facility] website at [email or web address] to review the yearly Consent to Treat document.   Patient unders (70 mg total) by mouth daily. 30 capsule 0   • [START ON 6/8/2021] Lisdexamfetamine Dimesylate (VYVANSE) 70 MG Oral Cap Take 1 capsule (70 mg total) by mouth every morning.  30 capsule 0   • buPROPion HCl ER, XL, 150 MG Oral Tablet 24 Hr Take 3 tablets (450 Social History:  Social History    Tobacco Use      Smoking status: Never Smoker      Smokeless tobacco: Never Used    Vaping Use      Vaping Use: Never used    Alcohol use:  Yes      Alcohol/week: 0.0 - 0.8 standard drinks    Drug use: No       REVIEW Refill: 0  - Lisdexamfetamine Dimesylate (VYVANSE) 70 MG Oral Cap; Take 1 capsule (70 mg total) by mouth every morning. Dispense: 30 capsule; Refill: 0    2. High risk medication use  - Lisdexamfetamine Dimesylate (VYVANSE) 70 MG Oral Cap;  Take 1 capsule capsule 0     Sig: Take 1 capsule (70 mg total) by mouth every morning. • buPROPion HCl ER, XL, 150 MG Oral Tablet 24 Hr 270 tablet 3     Sig: Take 3 tablets (450 mg total) by mouth every morning.        Return in about 6 months (around 10/7/2021) for [facility].

## 2021-04-09 DIAGNOSIS — G44.209 TENSION HEADACHE: ICD-10-CM

## 2021-04-12 RX ORDER — TIZANIDINE HYDROCHLORIDE 4 MG/1
4 CAPSULE, GELATIN COATED ORAL NIGHTLY PRN
Qty: 30 CAPSULE | Refills: 0 | Status: SHIPPED | OUTPATIENT
Start: 2021-04-12 | End: 2021-05-24

## 2021-05-24 DIAGNOSIS — M47.816 ARTHRITIS OF FACET JOINT OF LUMBAR SPINE: ICD-10-CM

## 2021-05-24 DIAGNOSIS — M43.10 ANTEROLISTHESIS: ICD-10-CM

## 2021-05-24 DIAGNOSIS — M47.816 OSTEOARTHRITIS OF LUMBAR SPINE, UNSPECIFIED SPINAL OSTEOARTHRITIS COMPLICATION STATUS: ICD-10-CM

## 2021-05-24 DIAGNOSIS — G44.209 TENSION HEADACHE: ICD-10-CM

## 2021-05-24 DIAGNOSIS — S39.012A ACUTE MYOFASCIAL STRAIN OF LUMBAR REGION, INITIAL ENCOUNTER: ICD-10-CM

## 2021-05-24 NOTE — TELEPHONE ENCOUNTER
TIZANIDINE HCL 4 MG Oral Cap 30 capsule 0 4/12/2021     ibuprofen 800 MG Oral Tab 60 tablet 0 8/3/2020     LOV 4/7/21. Return in about 6 months (around 10/7/2021) for Wellness visit, ADD, and depression. Nima Campos

## 2021-05-25 RX ORDER — TIZANIDINE HYDROCHLORIDE 4 MG/1
4 CAPSULE, GELATIN COATED ORAL NIGHTLY PRN
Qty: 30 CAPSULE | Refills: 0 | Status: SHIPPED | OUTPATIENT
Start: 2021-05-25 | End: 2021-06-21

## 2021-05-25 RX ORDER — IBUPROFEN 800 MG/1
800 TABLET ORAL EVERY 8 HOURS PRN
Qty: 60 TABLET | Refills: 0 | Status: SHIPPED | OUTPATIENT
Start: 2021-05-25

## 2021-06-21 DIAGNOSIS — G44.209 TENSION HEADACHE: ICD-10-CM

## 2021-06-22 RX ORDER — TIZANIDINE HYDROCHLORIDE 4 MG/1
4 CAPSULE, GELATIN COATED ORAL NIGHTLY PRN
Qty: 30 CAPSULE | Refills: 0 | Status: SHIPPED | OUTPATIENT
Start: 2021-06-22 | End: 2021-07-14

## 2021-06-22 NOTE — TELEPHONE ENCOUNTER
tiZANidine HCl 4 MG Oral Cap 30 capsule 0 5/25/2021     LOV 4/7/21   Return in about 6 months (around 10/7/2021) for Wellness visit, ADD, and depression  No future OV

## 2021-07-14 ENCOUNTER — PATIENT MESSAGE (OUTPATIENT)
Dept: FAMILY MEDICINE CLINIC | Facility: CLINIC | Age: 56
End: 2021-07-14

## 2021-07-14 DIAGNOSIS — G44.209 TENSION HEADACHE: ICD-10-CM

## 2021-07-14 DIAGNOSIS — F90.0 ATTENTION DEFICIT HYPERACTIVITY DISORDER (ADHD), PREDOMINANTLY INATTENTIVE TYPE: ICD-10-CM

## 2021-07-14 DIAGNOSIS — Z51.81 THERAPEUTIC DRUG MONITORING: ICD-10-CM

## 2021-07-14 DIAGNOSIS — Z79.899 HIGH RISK MEDICATION USE: ICD-10-CM

## 2021-07-14 RX ORDER — TIZANIDINE HYDROCHLORIDE 4 MG/1
4 CAPSULE, GELATIN COATED ORAL NIGHTLY PRN
Qty: 30 CAPSULE | Refills: 0 | Status: SHIPPED | OUTPATIENT
Start: 2021-07-14 | End: 2021-08-10

## 2021-07-14 NOTE — TELEPHONE ENCOUNTER
Last refill #30 on 6/22/2021  Last office visit pertaining to refill on 4/7/2021 - virtual visit  Next office visit due on or around 10/7/2021  No future appointments.

## 2021-07-15 NOTE — TELEPHONE ENCOUNTER
Pt requesting refill of   Requested Prescriptions     Pending Prescriptions Disp Refills   • Lisdexamfetamine Dimesylate (VYVANSE) 70 MG Oral Cap 30 capsule 0     Sig: Take 1 capsule (70 mg total) by mouth every morning.    • Lisdexamfetamine Dimesylate (VY

## 2021-07-15 NOTE — TELEPHONE ENCOUNTER
From: Miki Cowart  To: Jamie Cannon DO  Sent: 7/14/2021 7:05 PM CDT  Subject: Prescription Question    Dr. Marylee Collie,    Can you send my next 3 months of Vyvanse to the [facility] on [address] in [place]?     Thank you,    Rianna Fried

## 2021-08-10 DIAGNOSIS — G44.209 TENSION HEADACHE: ICD-10-CM

## 2021-08-10 RX ORDER — TIZANIDINE HYDROCHLORIDE 4 MG/1
4 CAPSULE, GELATIN COATED ORAL NIGHTLY PRN
Qty: 30 CAPSULE | Refills: 0 | Status: SHIPPED | OUTPATIENT
Start: 2021-08-10 | End: 2021-09-07

## 2021-08-10 NOTE — TELEPHONE ENCOUNTER
Requested Prescriptions     Pending Prescriptions Disp Refills   • tiZANidine HCl 4 MG Oral Cap 30 capsule 0     Sig: Take 1 capsule (4 mg total) by mouth nightly as needed for Muscle spasms.      Last fill was 7/14/21 30 caps  Last OV was 4/7/21 virtual  N

## 2021-09-05 DIAGNOSIS — G44.209 TENSION HEADACHE: ICD-10-CM

## 2021-09-07 RX ORDER — TIZANIDINE HYDROCHLORIDE 4 MG/1
4 CAPSULE, GELATIN COATED ORAL NIGHTLY PRN
Qty: 30 CAPSULE | Refills: 0 | Status: SHIPPED | OUTPATIENT
Start: 2021-09-07 | End: 2021-11-22 | Stop reason: ALTCHOICE

## 2021-09-07 NOTE — TELEPHONE ENCOUNTER
Refill for tizanidine approved. Please have patient make an in office appointment prior to any further refills on prescribed medications.

## 2021-09-07 NOTE — TELEPHONE ENCOUNTER
tiZANidine HCl 4 MG Oral Cap 30 capsule 0 8/10/2021    Sig:   Take 1 capsule (4 mg total) by mouth nightly as needed for Muscle spasms.        LOV 4/7/21 Telemed   Return in about 6 months (around 10/7/2021) for Wellness visit, ADD, and depression  No futur

## 2021-10-01 ENCOUNTER — TELEPHONE (OUTPATIENT)
Dept: FAMILY MEDICINE CLINIC | Facility: CLINIC | Age: 56
End: 2021-10-01

## 2021-10-01 DIAGNOSIS — G44.209 TENSION HEADACHE: ICD-10-CM

## 2021-10-01 RX ORDER — TIZANIDINE HYDROCHLORIDE 4 MG/1
4 CAPSULE, GELATIN COATED ORAL NIGHTLY PRN
Qty: 30 CAPSULE | Refills: 0 | Status: CANCELLED | OUTPATIENT
Start: 2021-10-01

## 2021-10-01 NOTE — TELEPHONE ENCOUNTER
Requested Prescriptions     Pending Prescriptions Disp Refills   • tiZANidine HCl 4 MG Oral Cap 30 capsule 0     Sig: Take 1 capsule (4 mg total) by mouth nightly as needed for Muscle spasms.      Last fill was 9/7/21 30 caps 0 refill  Madhuri Ryan DO

## 2021-10-20 NOTE — TELEPHONE ENCOUNTER
Patient requesting for medication refill(s) for Vyvance to be sent to [facility] on [address]. I advised patient to allow up to 24 to 48 hours for a call back from a nurse.  She does have a appt with Dr Samia Shaw in the beginning of Nov. And she will be out o

## 2021-10-20 NOTE — TELEPHONE ENCOUNTER
Lisdexamfetamine Dimesylate (VYVANSE) 70 MG Oral Cap 30 capsule 0 9/14/2021    Sig:   Take 1 capsule (70 mg total) by mouth every morning.      Route:   Oral     LOV Telemed 4/7/21  FOV 11/12/21

## 2021-10-21 ENCOUNTER — PATIENT MESSAGE (OUTPATIENT)
Dept: FAMILY MEDICINE CLINIC | Facility: CLINIC | Age: 56
End: 2021-10-21

## 2021-10-21 NOTE — TELEPHONE ENCOUNTER
From: Paulino Elizabeth  To: Scottie Saldana DO  Sent: 10/21/2021 9:57 AM CDT  Subject: Vyvanse refill - upcoming dr. Americo Pryor,    My next appt with you is on 11/12. However, I am out of Vyvanse and would like to request a refill.     I am currentl

## 2021-11-11 ENCOUNTER — TELEPHONE (OUTPATIENT)
Dept: FAMILY MEDICINE CLINIC | Facility: CLINIC | Age: 56
End: 2021-11-11

## 2021-11-12 ENCOUNTER — APPOINTMENT (OUTPATIENT)
Dept: GENERAL RADIOLOGY | Age: 56
End: 2021-11-12
Attending: EMERGENCY MEDICINE
Payer: COMMERCIAL

## 2021-11-12 ENCOUNTER — HOSPITAL ENCOUNTER (EMERGENCY)
Age: 56
Discharge: HOME | End: 2021-11-12
Attending: EMERGENCY MEDICINE
Payer: COMMERCIAL

## 2021-11-12 VITALS
WEIGHT: 220 LBS | HEIGHT: 65 IN | OXYGEN SATURATION: 98 % | SYSTOLIC BLOOD PRESSURE: 139 MMHG | TEMPERATURE: 98 F | BODY MASS INDEX: 36.65 KG/M2 | RESPIRATION RATE: 16 BRPM | HEART RATE: 84 BPM | DIASTOLIC BLOOD PRESSURE: 77 MMHG

## 2021-11-12 DIAGNOSIS — M79.601 RIGHT ARM PAIN: Primary | ICD-10-CM

## 2021-11-12 PROCEDURE — 99284 EMERGENCY DEPT VISIT MOD MDM: CPT

## 2021-11-12 PROCEDURE — 73030 X-RAY EXAM OF SHOULDER: CPT | Performed by: EMERGENCY MEDICINE

## 2021-11-12 PROCEDURE — 73060 X-RAY EXAM OF HUMERUS: CPT | Performed by: EMERGENCY MEDICINE

## 2021-11-12 PROCEDURE — 73080 X-RAY EXAM OF ELBOW: CPT | Performed by: EMERGENCY MEDICINE

## 2021-11-12 PROCEDURE — 99283 EMERGENCY DEPT VISIT LOW MDM: CPT

## 2021-11-12 PROCEDURE — 73562 X-RAY EXAM OF KNEE 3: CPT | Performed by: EMERGENCY MEDICINE

## 2021-11-12 RX ORDER — HYDROCODONE BITARTRATE AND ACETAMINOPHEN 5; 325 MG/1; MG/1
1 TABLET ORAL ONCE
Status: COMPLETED | OUTPATIENT
Start: 2021-11-12 | End: 2021-11-12

## 2021-11-12 RX ORDER — HYDROCODONE BITARTRATE AND ACETAMINOPHEN 5; 325 MG/1; MG/1
1-2 TABLET ORAL EVERY 6 HOURS PRN
Qty: 10 TABLET | Refills: 0 | Status: SHIPPED | OUTPATIENT
Start: 2021-11-12 | End: 2021-11-19

## 2021-11-12 NOTE — ED INITIAL ASSESSMENT (HPI)
States tripped over baby gate and landed on right side and both knees. No loc states pain in right upper arm down to wrist. Also both knees hurt with left worse than the right. Unable to abduct right arm.  Is painful to touch from mid humerus to the wrist.

## 2021-11-13 NOTE — ED PROVIDER NOTES
Patient Seen in: THE [facility] In [place]      History   Patient presents with:  Trauma  Arm or Hand Injury    Stated Complaint: fell this afternoon, fell over baby gate and landed on right leg, no loc    Subjective:   HPI    71-year-old Smoking status: Never Smoker      Smokeless tobacco: Never Used    Vaping Use      Vaping Use: Never used    Alcohol use: Yes      Alcohol/week: 0.0 - 0.8 standard drinks    Drug use:  No             Review of Systems    Positive for stated complaint: fell display       XR KNEE (3 VIEWS), LEFT (AUR=07707)    Result Date: 11/12/2021  PROCEDURE:  XR KNEE ROUTINE (3 VIEWS), LEFT (CPT=73562)  TECHNIQUE:  Three views were obtained including patellar view. COMPARISON:  None.   INDICATIONS:  PATIENT STATED HISTORY: ANDREINA, XR, XR SHOULDER, COMPLETE (MIN 2 VIEWS), RIGHT (CPT=73030), 5/22/2017, 11:42 AM.  PATIENT STATED HISTORY: (As transcribed by Technologist)  Left humerus pain, lateral proximal half.  Injured post fall today at 3pm.    FINDINGS:  BONES:  Normal. motion of the knee. Plan for x-ray of the knee assess for bony abnormality. The pain control reassess    7:41 pm  X-ray does not reveal any acute bony abnormality. On reassessment, patient initially reported some improvement to her pain.   With attemptin

## 2021-11-15 ENCOUNTER — TELEPHONE (OUTPATIENT)
Dept: ORTHOPEDICS CLINIC | Facility: CLINIC | Age: 56
End: 2021-11-15

## 2021-11-15 NOTE — TELEPHONE ENCOUNTER
Pt has an appt /Dr Angela Langston for rt bicep tear. Last imaging 11/12. Please advice, thanks.   Future Appointments   Date Time Provider Andree Juanita   11/17/2021  7:40 AM Kyle Castro MD [facility] [id number]   12/20/2021  5:00 PM Zaid Yi

## 2021-11-15 NOTE — TELEPHONE ENCOUNTER
Patient left a message on Saturday, 11/13/21 stating she was seen at the [place] ER for a possible right bicep tear. She was advised to be seen for a follow up by Wednesday, 11/17/21.  I called patient and left her a message asking her to call and schedu

## 2021-11-17 ENCOUNTER — OFFICE VISIT (OUTPATIENT)
Dept: ORTHOPEDICS CLINIC | Facility: CLINIC | Age: 56
End: 2021-11-17
Payer: COMMERCIAL

## 2021-11-17 DIAGNOSIS — R29.898 SHOULDER WEAKNESS: Primary | ICD-10-CM

## 2021-11-17 PROCEDURE — 99204 OFFICE O/P NEW MOD 45 MIN: CPT | Performed by: ORTHOPAEDIC SURGERY

## 2021-11-17 NOTE — H&P
[facility] MEDICAL [facility]OUPS - ORTHOPEDICS  [address]  [phone]     NEW PATIENT VISIT - HISTORY AND PHYSICAL EXAMINATION     Name: Pamela Mantilla   MRN: [id number]  Date: 11/17/2021     CC: Right shoulder pain    RE 2002   • COLONOSCOPY  2018    Dr. Nicky Gan   • HYSTERECTOMY  2003    partial. pt still has cervix and ovaries   •   , ,    • OTHER SURGICAL HISTORY      Broken jaw,  deviated septum,  deviated septum   • TUBAL LIGATION per history of present illness. SOCIAL HX:  Patient works as an  for a [employer]. She denies tobacco or recreational drug use. She lives with her  and daughter. She enjoys reading, listening to music.     PE:   There touch median, ulnar, radial and axillary nerve  Circulation:   Normal, 2+ radial pulse    The contralateral upper extremity is without limitation in range of motion or strength, no positive provocative maneuvers.      Radiographic Examination/Diagnostics: tissue swelling. EFFUSION:   None visible. OTHER:  Negative. CONCLUSION:  There is no acute abnormality in the right humerus.    Dictated by (CST): Santana Ribeiro MD on 11/12/2021 at 6:47 PM     Finalized by (CST): Santana Ribeiro MD on 11/12/2021 a completion, some grammatical, spelling, and word choice errors due to dictation may still occur.

## 2021-11-18 ENCOUNTER — TELEPHONE (OUTPATIENT)
Dept: ORTHOPEDICS CLINIC | Facility: CLINIC | Age: 56
End: 2021-11-18

## 2021-11-19 ENCOUNTER — TELEPHONE (OUTPATIENT)
Dept: ORTHOPEDICS CLINIC | Facility: CLINIC | Age: 56
End: 2021-11-19

## 2021-11-19 NOTE — TELEPHONE ENCOUNTER
Spoke to patient and scheduled appt  Future Appointments   Date Time Provider Andree Juanita   11/22/2021  3:00 PM Varghese Mitchell MD [facility] [id number]   12/20/2021  5:00 PM Jeffery Eduardo DO [facility] [place]   3/2/2022  8:00 AM Prieto Hawkins

## 2021-11-19 NOTE — TELEPHONE ENCOUNTER
----- Message from [facility], MD sent at 11/19/2021  2:59 PM CST -----  Can we double book her for next week to discuss surgery? Thanks!

## 2021-11-22 ENCOUNTER — OFFICE VISIT (OUTPATIENT)
Dept: ORTHOPEDICS CLINIC | Facility: CLINIC | Age: 56
End: 2021-11-22
Payer: COMMERCIAL

## 2021-11-22 DIAGNOSIS — M75.21 BICEPS TENDINITIS OF RIGHT UPPER EXTREMITY: ICD-10-CM

## 2021-11-22 DIAGNOSIS — S46.011A TRAUMATIC COMPLETE TEAR OF RIGHT ROTATOR CUFF, INITIAL ENCOUNTER: Primary | ICD-10-CM

## 2021-11-22 DIAGNOSIS — M75.41 SUBACROMIAL IMPINGEMENT OF RIGHT SHOULDER: ICD-10-CM

## 2021-11-22 PROCEDURE — 99215 OFFICE O/P EST HI 40 MIN: CPT | Performed by: ORTHOPAEDIC SURGERY

## 2021-11-22 NOTE — TELEPHONE ENCOUNTER
Patient informed of letter completion.    Future Appointments   Date Time Provider Andree Grant   11/22/2021  3:00 PM Judi Currie MD [facility] [id number]   12/20/2021  5:00 PM Davy Roland DO [facility] [place]   3/2/2022  8:00 AM Y

## 2021-11-22 NOTE — PROGRESS NOTES
OR BOOKING SHEET SHOULDER  Name: Elisa Peralta  MRN: [id number]   : 1965  Diagnosis:  [x] Traumatic complete tear of right rotator cuff, initial encounter [S46.011A]  Disposition:    [x] Ambulatory  [] Overnight for AMMON  [] Overnight for observatio

## 2021-11-23 ENCOUNTER — TELEPHONE (OUTPATIENT)
Dept: ORTHOPEDICS CLINIC | Facility: CLINIC | Age: 56
End: 2021-11-23

## 2021-11-23 NOTE — TELEPHONE ENCOUNTER
Surgical referral entered into Epic - No prior auth or precert required for OP CPT codes 08056,22833,30865 to be done at [facility]

## 2021-11-23 NOTE — TELEPHONE ENCOUNTER
Surgeon: Dr. Jessy Roberts    Date of Surgery: 11/30/21        Post Op Appt: 12/8/21    Facility: [facility]    Inpatient or Outpatient: Outpatient    Surgical Assistant: yes    Preadmission Testing Ordered:  yes    Pre-Op Clearance Requested:   PCP: yes   Cardiac: no

## 2021-11-23 NOTE — TELEPHONE ENCOUNTER
[facility]  Name: Dhruv Sigala  MRN: [id number]   : 1965  Diagnosis:  [x]? Traumatic complete tear of right rotator cuff, initial encounter [S46.011A]  Disposition:    [x]? Ambulatory  []? Overnight for AMMON  []?  Overnight for observ

## 2021-11-24 NOTE — PROGRESS NOTES
[facility]  [address]       Name: Khadar Odom   MRN: [id number]  Date: 11/22/2021     REASON FOR VISIT: Follow-up evaluation and MRI review of right shoulder traumatic inju Pulses: Normal pulses. Pulmonary:      Effort: Pulmonary effort is normal. No respiratory distress. Abdominal:      General: There is no distension. Skin:     General: Skin is warm. Capillary Refill: Capillary refill takes less than 2 seconds. Examination/Diagnostics:    Radiographs of the right shoulder as well as MRI of the right shoulder personally viewed, independently interpreted and radiology report was reviewed.       MRI SHOULDER, RIGHT (CPT=73221)    Result Date: 11/18/2021  Exam: [facility] is mild infraspinatus and teres minor muscle edema consistent with muscle strains. There is no significant rotator cuff muscle  atrophy. Moderate fluid is present in the subacromial/subdeltoid bursa consistent with bursitis. IMPRESSION: 1.  Large full-thick model to outline her pathology, as well as general surgical principles. We reviewed the risks associated with the proposed procedure.   In particular we discussed risks that include, but are not limited to infection, blood loss, potential transient or perma

## 2021-11-29 ENCOUNTER — TELEPHONE (OUTPATIENT)
Dept: FAMILY MEDICINE CLINIC | Facility: CLINIC | Age: 56
End: 2021-11-29

## 2021-11-29 ENCOUNTER — OFFICE VISIT (OUTPATIENT)
Dept: FAMILY MEDICINE CLINIC | Facility: CLINIC | Age: 56
End: 2021-11-29
Payer: COMMERCIAL

## 2021-11-29 VITALS
HEIGHT: 65 IN | OXYGEN SATURATION: 96 % | SYSTOLIC BLOOD PRESSURE: 138 MMHG | TEMPERATURE: 98 F | RESPIRATION RATE: 18 BRPM | HEART RATE: 96 BPM | DIASTOLIC BLOOD PRESSURE: 84 MMHG | BODY MASS INDEX: 36.82 KG/M2 | WEIGHT: 221 LBS

## 2021-11-29 DIAGNOSIS — Z01.818 PREOP EXAMINATION: Primary | ICD-10-CM

## 2021-11-29 DIAGNOSIS — Z79.899 HIGH RISK MEDICATION USE: ICD-10-CM

## 2021-11-29 DIAGNOSIS — E66.09 CLASS 2 OBESITY DUE TO EXCESS CALORIES WITH BODY MASS INDEX (BMI) OF 36.0 TO 36.9 IN ADULT, UNSPECIFIED WHETHER SERIOUS COMORBIDITY PRESENT: ICD-10-CM

## 2021-11-29 DIAGNOSIS — S46.011A TRAUMATIC COMPLETE TEAR OF RIGHT ROTATOR CUFF, INITIAL ENCOUNTER: ICD-10-CM

## 2021-11-29 DIAGNOSIS — M75.41 SUBACROMIAL IMPINGEMENT OF RIGHT SHOULDER: ICD-10-CM

## 2021-11-29 DIAGNOSIS — F90.0 ATTENTION DEFICIT HYPERACTIVITY DISORDER (ADHD), PREDOMINANTLY INATTENTIVE TYPE: ICD-10-CM

## 2021-11-29 DIAGNOSIS — M75.21 BICEPS TENDINITIS, RIGHT: ICD-10-CM

## 2021-11-29 PROBLEM — E55.9 VITAMIN D DEFICIENCY: Status: RESOLVED | Noted: 2018-03-26 | Resolved: 2021-11-29

## 2021-11-29 PROBLEM — E66.812 CLASS 2 OBESITY DUE TO EXCESS CALORIES WITH BODY MASS INDEX (BMI) OF 36.0 TO 36.9 IN ADULT: Status: ACTIVE | Noted: 2021-11-29

## 2021-11-29 PROBLEM — E66.01 SEVERE OBESITY (BMI 35.0-39.9): Status: RESOLVED | Noted: 2018-02-26 | Resolved: 2021-11-29

## 2021-11-29 PROCEDURE — 3008F BODY MASS INDEX DOCD: CPT | Performed by: FAMILY MEDICINE

## 2021-11-29 PROCEDURE — 3075F SYST BP GE 130 - 139MM HG: CPT | Performed by: FAMILY MEDICINE

## 2021-11-29 PROCEDURE — 3079F DIAST BP 80-89 MM HG: CPT | Performed by: FAMILY MEDICINE

## 2021-11-29 PROCEDURE — 99244 OFF/OP CNSLTJ NEW/EST MOD 40: CPT | Performed by: FAMILY MEDICINE

## 2021-11-29 NOTE — PROGRESS NOTES
Paulino Elizabeth is a 55year old female.   HPI:       This is a 29-year-old obese  female who presents for preop history and physical with medical clearance prior to right shoulder surgery as requested by her surgeon Dr. Gordon Collins scheduled for 11/30/20 due to excess calories with body mass index (BMI) of 36.0 to 36.9 in adult 11/29/2021   • Esophageal reflux 2013   • Fracture, jaw ([facility] [address] 1975   • Globus sensation 2/11/2015   • Major depressive disorder, recurrent episode, moderate ([facility] [address] 3/26/2018   • Recu headaches. Psychiatric/Behavioral: Negative for dysphoric mood. The patient is not nervous/anxious.         EXAM:   /84   Pulse 96   Temp 98.4 °F (36.9 °C)   Resp 18   Ht 5' 5\" (1.651 m)   Wt 221 lb (100.2 kg)   SpO2 96%   BMI 36.78 kg/m²  Estimate ASSESSMENT AND PLAN:     Preop examination  (primary encounter diagnosis)  Traumatic complete tear of right rotator cuff, initial encounter  Subacromial impingement of right shoulder  Biceps tendinitis, right  Class 2 obesity due to excess calories 70 MG Oral Cap; Take 1 capsule (70 mg total) by mouth every morning. Dispense: 30 capsule; Refill: 0    7. High risk medication use  Medication use, risks, benefits, side effects and precautions discussed, patient verbalizes understanding.  Questions encou

## 2021-11-30 ENCOUNTER — SURGERY CENTER DOCUMENTATION (OUTPATIENT)
Dept: SURGERY | Age: 56
End: 2021-11-30

## 2021-11-30 ENCOUNTER — ORDER TRANSCRIPTION (OUTPATIENT)
Dept: PHYSICAL THERAPY | Facility: HOSPITAL | Age: 56
End: 2021-11-30

## 2021-11-30 ENCOUNTER — TELEPHONE (OUTPATIENT)
Dept: PHYSICAL THERAPY | Facility: HOSPITAL | Age: 56
End: 2021-11-30

## 2021-11-30 DIAGNOSIS — Z98.890 STATUS POST SUBACROMIAL DECOMPRESSION: ICD-10-CM

## 2021-11-30 DIAGNOSIS — S46.011A TRAUMATIC COMPLETE TEAR OF RIGHT ROTATOR CUFF, INITIAL ENCOUNTER: Primary | ICD-10-CM

## 2021-11-30 DIAGNOSIS — Z98.890 S/P ARTHROSCOPY OF RIGHT SHOULDER: ICD-10-CM

## 2021-11-30 DIAGNOSIS — Z98.890 STATUS POST ROTATOR CUFF REPAIR: Primary | ICD-10-CM

## 2021-11-30 RX ORDER — TRAMADOL HYDROCHLORIDE 50 MG/1
TABLET ORAL
Qty: 20 TABLET | Refills: 1 | Status: SHIPPED | OUTPATIENT
Start: 2021-11-30 | End: 2021-12-11

## 2021-11-30 RX ORDER — ONDANSETRON 4 MG/1
4 TABLET, ORALLY DISINTEGRATING ORAL EVERY 8 HOURS PRN
Qty: 8 TABLET | Refills: 0 | Status: SHIPPED | OUTPATIENT
Start: 2021-11-30 | End: 2021-12-20

## 2021-12-01 ENCOUNTER — MED REC SCAN ONLY (OUTPATIENT)
Dept: ORTHOPEDICS CLINIC | Facility: CLINIC | Age: 56
End: 2021-12-01

## 2021-12-01 NOTE — PROCEDURES
OPERATIVE RECORD  ATTENDING SURGEON: Ismael Plummer MD  ASSISTANT(S): Merrill Suero [place]  PRELIMINARY DIAGNOSIS:  1. Right Shoulder Full Thickness Rotator Cuff Tear  2. Right Shoulder Biceps Tendinitis  3. Right Shoulder Subacromial Impingement  4. immediate postoperative complications noted. COUNTS: All sponge and needle counts were correct at the conclusion of the procedure.     OPERATIVE FINDINGS:  Massive full-thickness rotator cuff involving the entirety of the supraspinatus and infraspinatus wi identity by name and birthday. We reviewed and confirmed the surgical consent including laterality. The surgical site was marked with my initials.   We re-reviewed the risks and benefits of the procedure and I answered all additional questions to her satis intra-articular portion of the long head of the biceps tendon.     Supraspinatus  full-thickness retracted tear   Infraspinatus  full-thickness retracted tear   Hill-Sachs Lesion None     Upon completion of the diagnostic arthroscopy, the abnormal fraying o arthroscopic obturator, the subacromial space was entered. Using a sweeping motion adequate visualization was obtained.   With the arthroscope in the posterior viewing portal, coablation was introduced from the anterolateral portal. The CA ligament was not obtained. A towel was used to extract fluid from the arthroscopic incisions and closure was performed using buried interrupted 2-0 Vicryl, 3-0 Monocryl, Dermabond, Steri-Strips, gauze, and Tegaderm dressing.   The patient was placed into a shoulder imm

## 2021-12-06 ENCOUNTER — OFFICE VISIT (OUTPATIENT)
Dept: PHYSICAL THERAPY | Age: 56
End: 2021-12-06
Attending: ORTHOPAEDIC SURGERY
Payer: COMMERCIAL

## 2021-12-06 ENCOUNTER — TELEPHONE (OUTPATIENT)
Dept: PHYSICAL THERAPY | Facility: HOSPITAL | Age: 56
End: 2021-12-06

## 2021-12-06 DIAGNOSIS — Z98.890 STATUS POST SUBACROMIAL DECOMPRESSION: ICD-10-CM

## 2021-12-06 DIAGNOSIS — Z98.890 STATUS POST ROTATOR CUFF REPAIR: ICD-10-CM

## 2021-12-06 DIAGNOSIS — Z98.890 S/P ARTHROSCOPY OF RIGHT SHOULDER: ICD-10-CM

## 2021-12-06 PROCEDURE — 97110 THERAPEUTIC EXERCISES: CPT | Performed by: PHYSICAL THERAPIST

## 2021-12-06 PROCEDURE — 97162 PT EVAL MOD COMPLEX 30 MIN: CPT | Performed by: PHYSICAL THERAPIST

## 2021-12-08 ENCOUNTER — OFFICE VISIT (OUTPATIENT)
Dept: ORTHOPEDICS CLINIC | Facility: CLINIC | Age: 56
End: 2021-12-08
Payer: COMMERCIAL

## 2021-12-08 ENCOUNTER — TELEPHONE (OUTPATIENT)
Dept: ORTHOPEDICS CLINIC | Facility: CLINIC | Age: 56
End: 2021-12-08

## 2021-12-08 VITALS — HEIGHT: 65 IN | WEIGHT: 221 LBS | HEART RATE: 101 BPM | BODY MASS INDEX: 36.82 KG/M2 | OXYGEN SATURATION: 97 %

## 2021-12-08 DIAGNOSIS — Z98.890 S/P ARTHROSCOPY OF SHOULDER: Primary | ICD-10-CM

## 2021-12-08 PROCEDURE — 3008F BODY MASS INDEX DOCD: CPT | Performed by: PHYSICIAN ASSISTANT

## 2021-12-08 PROCEDURE — 99024 POSTOP FOLLOW-UP VISIT: CPT | Performed by: PHYSICIAN ASSISTANT

## 2021-12-08 NOTE — PROGRESS NOTES
[facility]  [facility] [address] [facility] [phone]       Name: Ivon Serrato   MRN: [id number]  Date: 12/8/2021     REASON FOR VISIT: First Post-Surgical Visit   Surgery: Right shoulder ro examination the patient is alert and oriented x3, well-developed, well-nourished, no acute distress. Tegaderm dressings were removed, and Steri-Strips were maintained and kept in place.     Incisional sites are clean dry intact without signs of active p visible soft tissue swelling. EFFUSION:  None visible. OTHER:  Negative. CONCLUSION:  There are enthesophytes of the distal humerus. There is no acute fracture detected.    Dictated by (CST): Gracie Mitchell MD on 11/12/2021 at 7:29 PM     Prakash Arias (CPT=73221)    Result Date: 11/18/2021  Exam: MRI SHOULDER RT W/O CONTRAST CPT Code(s): 60040 - MRI JOINT UPR EXTREM W/O DYE INDICATION:  Shoulder weakness. Symptoms of rotator cuff and labral pathology. Internal derangement of the shoulder.  History of fal consistent with bursitis. IMPRESSION: 1. Large full-thickness, full width tears of the supraspinatus and infraspinatus tendons with tendon retraction of up to 3.0 cm. The infraspinatus tendon tear extends back to the musculotendinous junction.  Subscapulari errors due to dictation may still occur.

## 2021-12-08 NOTE — TELEPHONE ENCOUNTER
Patient dropped off FMLA forms. Had surgery on 11/30/2021 for Rt Shoulder. Forms placed in WDR NS bin to be completed. Advised patient to call our office to make $25 dollar payment for FMLA forms. Patient verbalized and understood.  Requesting for fo

## 2021-12-11 DIAGNOSIS — S46.011A TRAUMATIC COMPLETE TEAR OF RIGHT ROTATOR CUFF, INITIAL ENCOUNTER: ICD-10-CM

## 2021-12-13 ENCOUNTER — OFFICE VISIT (OUTPATIENT)
Dept: PHYSICAL THERAPY | Age: 56
End: 2021-12-13
Attending: ORTHOPAEDIC SURGERY
Payer: COMMERCIAL

## 2021-12-13 DIAGNOSIS — Z98.890 S/P ARTHROSCOPY OF RIGHT SHOULDER: ICD-10-CM

## 2021-12-13 DIAGNOSIS — Z98.890 STATUS POST ROTATOR CUFF REPAIR: ICD-10-CM

## 2021-12-13 DIAGNOSIS — Z98.890 STATUS POST SUBACROMIAL DECOMPRESSION: ICD-10-CM

## 2021-12-13 PROCEDURE — 97110 THERAPEUTIC EXERCISES: CPT | Performed by: PHYSICAL THERAPIST

## 2021-12-13 RX ORDER — TRAMADOL HYDROCHLORIDE 50 MG/1
TABLET ORAL
Qty: 20 TABLET | Refills: 1 | Status: SHIPPED | OUTPATIENT
Start: 2021-12-13

## 2021-12-13 NOTE — PROGRESS NOTES
Dx: Status post rotator cuff repair (A96.473)  S/P arthroscopy of right shoulder (W78.855)  Status post subacromial decompression (L27.686)  Authorized # of Visits:  20  Fall Risk: standard         Precautions:  Follow protocol       Subjective:   Current P

## 2021-12-15 ENCOUNTER — OFFICE VISIT (OUTPATIENT)
Dept: PHYSICAL THERAPY | Age: 56
End: 2021-12-15
Attending: ORTHOPAEDIC SURGERY
Payer: COMMERCIAL

## 2021-12-15 DIAGNOSIS — Z98.890 S/P ARTHROSCOPY OF RIGHT SHOULDER: ICD-10-CM

## 2021-12-15 DIAGNOSIS — Z98.890 STATUS POST ROTATOR CUFF REPAIR: ICD-10-CM

## 2021-12-15 DIAGNOSIS — Z98.890 STATUS POST SUBACROMIAL DECOMPRESSION: ICD-10-CM

## 2021-12-15 PROCEDURE — 97110 THERAPEUTIC EXERCISES: CPT | Performed by: PHYSICAL THERAPIST

## 2021-12-15 NOTE — PROGRESS NOTES
Dx: Status post rotator cuff repair (Z50.963)  S/P arthroscopy of right shoulder (O48.333)  Status post subacromial decompression (E79.770)  Authorized # of Visits:  20  Fall Risk: standard         Precautions:  Follow protocol       Subjective:   Current P

## 2021-12-16 ENCOUNTER — TELEPHONE (OUTPATIENT)
Dept: ORTHOPEDICS CLINIC | Facility: CLINIC | Age: 56
End: 2021-12-16

## 2021-12-16 NOTE — TELEPHONE ENCOUNTER
Short Term Disability forms received via fax for patient. Forms placed in pending [place] folder to review and complete.    Patient had surgery on 11/30/21 for Right Rotator cuff repair

## 2021-12-20 ENCOUNTER — OFFICE VISIT (OUTPATIENT)
Dept: FAMILY MEDICINE CLINIC | Facility: CLINIC | Age: 56
End: 2021-12-20
Payer: COMMERCIAL

## 2021-12-20 VITALS
WEIGHT: 223 LBS | HEIGHT: 65 IN | OXYGEN SATURATION: 96 % | DIASTOLIC BLOOD PRESSURE: 80 MMHG | BODY MASS INDEX: 37.15 KG/M2 | TEMPERATURE: 98 F | HEART RATE: 90 BPM | SYSTOLIC BLOOD PRESSURE: 130 MMHG

## 2021-12-20 DIAGNOSIS — Z00.00 ROUTINE GENERAL MEDICAL EXAMINATION AT A HEALTH CARE FACILITY: Primary | ICD-10-CM

## 2021-12-20 DIAGNOSIS — R73.03 PREDIABETES: ICD-10-CM

## 2021-12-20 DIAGNOSIS — E66.01 CLASS 2 SEVERE OBESITY DUE TO EXCESS CALORIES WITH SERIOUS COMORBIDITY AND BODY MASS INDEX (BMI) OF 37.0 TO 37.9 IN ADULT (HCC): ICD-10-CM

## 2021-12-20 DIAGNOSIS — Z23 NEED FOR VACCINATION: ICD-10-CM

## 2021-12-20 DIAGNOSIS — Z12.31 ENCOUNTER FOR SCREENING MAMMOGRAM FOR MALIGNANT NEOPLASM OF BREAST: ICD-10-CM

## 2021-12-20 PROBLEM — E66.812 CLASS 2 SEVERE OBESITY DUE TO EXCESS CALORIES WITH SERIOUS COMORBIDITY AND BODY MASS INDEX (BMI) OF 37.0 TO 37.9 IN ADULT (HCC): Status: ACTIVE | Noted: 2021-12-20

## 2021-12-20 PROCEDURE — 90471 IMMUNIZATION ADMIN: CPT | Performed by: FAMILY MEDICINE

## 2021-12-20 PROCEDURE — 99396 PREV VISIT EST AGE 40-64: CPT | Performed by: FAMILY MEDICINE

## 2021-12-20 PROCEDURE — 3075F SYST BP GE 130 - 139MM HG: CPT | Performed by: FAMILY MEDICINE

## 2021-12-20 PROCEDURE — 90750 HZV VACC RECOMBINANT IM: CPT | Performed by: FAMILY MEDICINE

## 2021-12-20 PROCEDURE — 3079F DIAST BP 80-89 MM HG: CPT | Performed by: FAMILY MEDICINE

## 2021-12-20 PROCEDURE — 3008F BODY MASS INDEX DOCD: CPT | Performed by: FAMILY MEDICINE

## 2021-12-20 NOTE — PROGRESS NOTES
HPI:   Claudia Armenta is a 65year old female     Symptoms: denies discharge, itching, burning or dysuria.      Last PAP: 2018  Abnormal PAP: no h/o abnormal pap    Last colonoscopy: UTD  Last mammogram: due      Wt Readings from Last 6 Encounters:  12/20/2 every morning.  (Patient not taking: No sig reported) 30 capsule 0      Past Medical History:   Diagnosis Date   • Attention deficit hyperactivity disorder (ADHD), predominantly inattentive type 9/12/2019   • BMI 34.0-34.9,adult 2/11/2015   • BMI 35.0-35.9, Grandfather         colon cancer      Social History:   Social History    Tobacco Use      Smoking status: Never Smoker      Smokeless tobacco: Never Used    Vaping Use      Vaping Use: Never used    Alcohol use:  Yes      Alcohol/week: 0.0 - 0.8 standard d normal S1S2  ABD: Obese. Soft, non tender to palpation. No masses, HSM, or pulsations appreciated. MUSCULOSKELETAL: gait normal, no gross M/S defect. EXTREMITIES: no clubbing, cyanosis, or edema  NEURO: Alert and oriented x3.   No gross motor or sensory 4.1   Chloride      98 - 112 mmol/L  108   Carbon Dioxide, Total      21.0 - 32.0 mmol/L  27.0   ANION GAP      0 - 18 mmol/L  5   BUN      7 - 18 mg/dL  16   CREATININE      0.55 - 1.02 mg/dL  0.77   BUN/CREATININE RATIO      10.0 - 20.0  20.8 (H)   CALCI Future  - LIPID PANEL; Future    2. Need for vaccination  - ZOSTER VACC RECOMBINANT IM Doc Horace Candace 85    3. Encounter for screening mammogram for malignant neoplasm of breast  - [facility] CARROL 2D+3D SCREENING BILAT (CPT=77067/47350); Future    4.  Class 2 severe obesity due

## 2021-12-20 NOTE — PATIENT INSTRUCTIONS
Prevention Guidelines, Women Ages 48 to 59  Screening tests and vaccines are an important part of managing your health. A screening test is done to find possible disorders or diseases in people who don't have any symptoms.  The goal is to find a disease e have had a complete hysterectomy  Pap test every 3 years or Pap test with human papillomavirus (HPV) test every 5 years    Chlamydia Women who are sexually active and at increased risk for infection  At yearly routine exams    Colorectal cancer All women a with your healthcare provider for more information.     Obesity All women in this age group  At yearly routine exams    Osteoporosis Women who are postmenopausal  Talk with your healthcare provider    Syphilis Women at increased risk for infection  At [facility] Tetanus/diphtheria/pertussis (Td/Tdap) booster All women in this age group  Td every 10 years, or a 1-time dose of Tdap instead of a Td booster after age 25, then Td every 10 years    Recombinant zoster vaccine ([facility])  All women ages 48 and older  If 2 do

## 2021-12-21 ENCOUNTER — OFFICE VISIT (OUTPATIENT)
Dept: PHYSICAL THERAPY | Age: 56
End: 2021-12-21
Attending: ORTHOPAEDIC SURGERY
Payer: COMMERCIAL

## 2021-12-21 DIAGNOSIS — Z98.890 STATUS POST SUBACROMIAL DECOMPRESSION: ICD-10-CM

## 2021-12-21 DIAGNOSIS — Z98.890 STATUS POST ROTATOR CUFF REPAIR: ICD-10-CM

## 2021-12-21 DIAGNOSIS — Z98.890 S/P ARTHROSCOPY OF RIGHT SHOULDER: ICD-10-CM

## 2021-12-21 PROCEDURE — 97110 THERAPEUTIC EXERCISES: CPT | Performed by: PHYSICAL THERAPIST

## 2021-12-21 NOTE — PROGRESS NOTES
Dx: Status post rotator cuff repair (C90.438)  S/P arthroscopy of right shoulder (P11.615)  Status post subacromial decompression (C63.006)  Authorized # of Visits:  20  Fall Risk: standard         Precautions:  Follow protocol       Subjective:   Current P

## 2021-12-27 ENCOUNTER — OFFICE VISIT (OUTPATIENT)
Dept: PHYSICAL THERAPY | Age: 56
End: 2021-12-27
Attending: ORTHOPAEDIC SURGERY
Payer: COMMERCIAL

## 2021-12-27 DIAGNOSIS — Z98.890 STATUS POST SUBACROMIAL DECOMPRESSION: ICD-10-CM

## 2021-12-27 DIAGNOSIS — Z98.890 STATUS POST ROTATOR CUFF REPAIR: ICD-10-CM

## 2021-12-27 DIAGNOSIS — Z98.890 S/P ARTHROSCOPY OF RIGHT SHOULDER: ICD-10-CM

## 2021-12-27 PROCEDURE — 97110 THERAPEUTIC EXERCISES: CPT

## 2021-12-27 NOTE — PROGRESS NOTES
Dx: Status post rotator cuff repair (Y03.084)  S/P arthroscopy of right shoulder (Z68.217)  Status post subacromial decompression (C95.014)  Authorized # of Visits:  20  Fall Risk: standard         Precautions:  Follow protocol       Subjective:   Current P

## 2021-12-28 ENCOUNTER — LAB ENCOUNTER (OUTPATIENT)
Dept: LAB | Age: 56
End: 2021-12-28
Attending: FAMILY MEDICINE
Payer: COMMERCIAL

## 2021-12-28 DIAGNOSIS — Z00.00 ROUTINE GENERAL MEDICAL EXAMINATION AT A HEALTH CARE FACILITY: ICD-10-CM

## 2021-12-28 DIAGNOSIS — R73.03 PREDIABETES: ICD-10-CM

## 2021-12-28 PROCEDURE — 84439 ASSAY OF FREE THYROXINE: CPT | Performed by: FAMILY MEDICINE

## 2021-12-28 PROCEDURE — 80061 LIPID PANEL: CPT | Performed by: FAMILY MEDICINE

## 2021-12-28 PROCEDURE — 80050 GENERAL HEALTH PANEL: CPT | Performed by: FAMILY MEDICINE

## 2021-12-28 PROCEDURE — 83036 HEMOGLOBIN GLYCOSYLATED A1C: CPT | Performed by: FAMILY MEDICINE

## 2021-12-29 ENCOUNTER — TELEPHONE (OUTPATIENT)
Dept: ORTHOPEDICS CLINIC | Facility: CLINIC | Age: 56
End: 2021-12-29

## 2021-12-29 ENCOUNTER — OFFICE VISIT (OUTPATIENT)
Dept: PHYSICAL THERAPY | Age: 56
End: 2021-12-29
Attending: ORTHOPAEDIC SURGERY
Payer: COMMERCIAL

## 2021-12-29 DIAGNOSIS — Z98.890 S/P ARTHROSCOPY OF RIGHT SHOULDER: ICD-10-CM

## 2021-12-29 DIAGNOSIS — Z98.890 STATUS POST SUBACROMIAL DECOMPRESSION: ICD-10-CM

## 2021-12-29 DIAGNOSIS — Z98.890 STATUS POST ROTATOR CUFF REPAIR: ICD-10-CM

## 2021-12-29 PROCEDURE — 97110 THERAPEUTIC EXERCISES: CPT

## 2021-12-29 PROCEDURE — 97140 MANUAL THERAPY 1/> REGIONS: CPT

## 2021-12-29 NOTE — PROGRESS NOTES
Dx: Status post rotator cuff repair (X23.451)  S/P arthroscopy of right shoulder (R14.149)  Status post subacromial decompression (L41.014)  Authorized # of Visits:  20  Fall Risk: standard         Precautions:  Follow protocol       Subjective:   Current P

## 2021-12-29 NOTE — TELEPHONE ENCOUNTER
Patient calling requesting an update on her short term disability paperwork. Patient states she only has 15 days left to have paperwork filled out.  Please advise

## 2022-01-03 ENCOUNTER — OFFICE VISIT (OUTPATIENT)
Dept: PHYSICAL THERAPY | Age: 57
End: 2022-01-03
Attending: ORTHOPAEDIC SURGERY
Payer: COMMERCIAL

## 2022-01-03 DIAGNOSIS — Z98.890 STATUS POST SUBACROMIAL DECOMPRESSION: ICD-10-CM

## 2022-01-03 DIAGNOSIS — Z98.890 S/P ARTHROSCOPY OF RIGHT SHOULDER: ICD-10-CM

## 2022-01-03 DIAGNOSIS — Z98.890 STATUS POST ROTATOR CUFF REPAIR: ICD-10-CM

## 2022-01-03 PROCEDURE — 97110 THERAPEUTIC EXERCISES: CPT

## 2022-01-03 NOTE — PROGRESS NOTES
Dx: Status post rotator cuff repair (M31.396)  S/P arthroscopy of right shoulder (Q91.868)  Status post subacromial decompression (A51.976)  Authorized # of Visits:  20  Fall Risk: standard         Precautions: Follow protocol    November 30th surgery.

## 2022-01-05 ENCOUNTER — OFFICE VISIT (OUTPATIENT)
Dept: ORTHOPEDICS CLINIC | Facility: CLINIC | Age: 57
End: 2022-01-05
Payer: COMMERCIAL

## 2022-01-05 ENCOUNTER — OFFICE VISIT (OUTPATIENT)
Dept: PHYSICAL THERAPY | Age: 57
End: 2022-01-05
Attending: ORTHOPAEDIC SURGERY
Payer: COMMERCIAL

## 2022-01-05 DIAGNOSIS — Z98.890 STATUS POST SUBACROMIAL DECOMPRESSION: ICD-10-CM

## 2022-01-05 DIAGNOSIS — Z98.890 S/P ARTHROSCOPY OF SHOULDER: Primary | ICD-10-CM

## 2022-01-05 DIAGNOSIS — Z98.890 STATUS POST ROTATOR CUFF REPAIR: ICD-10-CM

## 2022-01-05 DIAGNOSIS — Z98.890 S/P ARTHROSCOPY OF RIGHT SHOULDER: ICD-10-CM

## 2022-01-05 PROCEDURE — 99024 POSTOP FOLLOW-UP VISIT: CPT | Performed by: PHYSICIAN ASSISTANT

## 2022-01-05 PROCEDURE — 97140 MANUAL THERAPY 1/> REGIONS: CPT

## 2022-01-05 PROCEDURE — 97110 THERAPEUTIC EXERCISES: CPT

## 2022-01-05 NOTE — PROGRESS NOTES
[facility]  [address]       Name: Sydney San   MRN: [id number]  Date: 12/8/2021     REASON FOR VISIT: First Post-Surgical Visit   Surgery: Right shoulder ro palpation. The contralateral shoulder is without limitation in range of motion or strength, no positive provocative maneuvers.        Radiographic Examination/Diagnostics:    I personally viewed, independently interpreted and radiology report was reviewe 11/12/2021 at 7:29 PM       XR HUMERUS (MIN 2 VIEWS), RIGHT (CPT=73060)    Result Date: 11/12/2021  PROCEDURE:  XR HUMERUS (MIN 2 VIEWS), RIGHT(CPT=73060)  INDICATIONS:  fell this afternoon, fell over baby gate and landed on right leg, no loc  COMPARISON: involving the musculoskeletal system COMPARISON:  Right shoulder radiographs dated 11/12/2021.  TECHNIQUE: Routine MR exam of the right shoulder performed on a wide bore ultra high field 3.0 T Siemens Skyra MR scanner, without intravenous contrast. FINDINGS tearing. Infraspinatus and teres minor muscle strains with no evidence of rotator cuff muscle atrophy. 2. Moderate subacromial/subdeltoid bursitis. 3. Ill-defined degenerative tearing of the superior glenoid labrum.  4. Tendinosis and minimal partial inters

## 2022-01-05 NOTE — PROGRESS NOTES
Dx: Status post rotator cuff repair (Z59.239)  S/P arthroscopy of right shoulder (Q66.482)  Status post subacromial decompression (Z54.831)  Authorized # of Visits:  20  Fall Risk: standard         Precautions: Follow protocol    November 30th surgery.

## 2022-01-06 ENCOUNTER — TELEPHONE (OUTPATIENT)
Dept: ORTHOPEDICS CLINIC | Facility: CLINIC | Age: 57
End: 2022-01-06

## 2022-01-06 NOTE — TELEPHONE ENCOUNTER
Patient is not getting paid for her short term disability. Did you receive the fax from 8:24 this morning from [facility]?  And if so, will you be able to fill it out today? Please advise the patient, either way.

## 2022-01-07 NOTE — TELEPHONE ENCOUNTER
Faxed Attending Physician's Statement to [facility] at [phone] & [phone] and sent copy to central scanning. Pt notified.

## 2022-01-10 ENCOUNTER — OFFICE VISIT (OUTPATIENT)
Dept: PHYSICAL THERAPY | Age: 57
End: 2022-01-10
Attending: ORTHOPAEDIC SURGERY
Payer: COMMERCIAL

## 2022-01-10 DIAGNOSIS — Z98.890 STATUS POST ROTATOR CUFF REPAIR: ICD-10-CM

## 2022-01-10 DIAGNOSIS — Z98.890 S/P ARTHROSCOPY OF RIGHT SHOULDER: ICD-10-CM

## 2022-01-10 DIAGNOSIS — Z98.890 STATUS POST SUBACROMIAL DECOMPRESSION: ICD-10-CM

## 2022-01-10 PROCEDURE — 97110 THERAPEUTIC EXERCISES: CPT

## 2022-01-10 NOTE — PROGRESS NOTES
Dx: Status post rotator cuff repair (V43.984)  S/P arthroscopy of right shoulder (O44.065)  Status post subacromial decompression (Y52.706)  Authorized # of Visits:  20  Fall Risk: standard         Precautions: Follow protocol    November 30th surgery. therapy 2x/wk per protocol. Charges:  TherEx 2      Total Timed Treatment: 35 min  Total Treatment Time: 35 min

## 2022-01-12 ENCOUNTER — OFFICE VISIT (OUTPATIENT)
Dept: PHYSICAL THERAPY | Age: 57
End: 2022-01-12
Attending: ORTHOPAEDIC SURGERY
Payer: COMMERCIAL

## 2022-01-12 DIAGNOSIS — Z98.890 STATUS POST ROTATOR CUFF REPAIR: ICD-10-CM

## 2022-01-12 DIAGNOSIS — Z98.890 STATUS POST SUBACROMIAL DECOMPRESSION: ICD-10-CM

## 2022-01-12 DIAGNOSIS — Z98.890 S/P ARTHROSCOPY OF RIGHT SHOULDER: ICD-10-CM

## 2022-01-12 PROCEDURE — 97110 THERAPEUTIC EXERCISES: CPT

## 2022-01-12 NOTE — PROGRESS NOTES
Dx: Status post rotator cuff repair (J55.324)  S/P arthroscopy of right shoulder (U25.837)  Status post subacromial decompression (O23.168)  Authorized # of Visits:  20  Fall Risk: standard         Precautions: Follow protocol    November 30th surgery.

## 2022-01-17 ENCOUNTER — OFFICE VISIT (OUTPATIENT)
Dept: PHYSICAL THERAPY | Age: 57
End: 2022-01-17
Attending: ORTHOPAEDIC SURGERY
Payer: COMMERCIAL

## 2022-01-17 DIAGNOSIS — Z98.890 S/P ARTHROSCOPY OF RIGHT SHOULDER: ICD-10-CM

## 2022-01-17 DIAGNOSIS — Z98.890 STATUS POST ROTATOR CUFF REPAIR: ICD-10-CM

## 2022-01-17 DIAGNOSIS — Z98.890 STATUS POST SUBACROMIAL DECOMPRESSION: ICD-10-CM

## 2022-01-17 PROCEDURE — 97110 THERAPEUTIC EXERCISES: CPT

## 2022-01-17 PROCEDURE — 97140 MANUAL THERAPY 1/> REGIONS: CPT

## 2022-01-17 NOTE — PROGRESS NOTES
Dx: Status post rotator cuff repair (Q12.837)  S/P arthroscopy of right shoulder (A08.631)  Status post subacromial decompression (Y56.472)  Authorized # of Visits:  20  Fall Risk: standard         Precautions: Follow protocol    November 30th surgery. Doing well with PROM altogether. AAROM up to 110 degrees before assistance is needed for range of motion completion. Plan:   Physical therapy 2x/wk per protocol. Charges: TherEx 2 (25 min);  Manual PT 1 (10 min)      Total Timed Treatment: 40 min  T

## 2022-01-19 ENCOUNTER — APPOINTMENT (OUTPATIENT)
Dept: PHYSICAL THERAPY | Age: 57
End: 2022-01-19
Attending: ORTHOPAEDIC SURGERY
Payer: COMMERCIAL

## 2022-01-19 DIAGNOSIS — Z98.890 S/P ARTHROSCOPY OF RIGHT SHOULDER: ICD-10-CM

## 2022-01-19 DIAGNOSIS — Z98.890 STATUS POST ROTATOR CUFF REPAIR: ICD-10-CM

## 2022-01-19 DIAGNOSIS — Z98.890 STATUS POST SUBACROMIAL DECOMPRESSION: ICD-10-CM

## 2022-01-19 PROCEDURE — 97140 MANUAL THERAPY 1/> REGIONS: CPT

## 2022-01-19 PROCEDURE — 97110 THERAPEUTIC EXERCISES: CPT

## 2022-01-19 NOTE — PROGRESS NOTES
Dx: Status post rotator cuff repair (P38.905)  S/P arthroscopy of right shoulder (J37.978)  Status post subacromial decompression (P61.977)  Authorized # of Visits:  20  Fall Risk: standard         Precautions: Follow protocol    November 30th surgery.

## 2022-01-24 ENCOUNTER — OFFICE VISIT (OUTPATIENT)
Dept: PHYSICAL THERAPY | Age: 57
End: 2022-01-24
Attending: ORTHOPAEDIC SURGERY
Payer: COMMERCIAL

## 2022-01-24 DIAGNOSIS — Z98.890 STATUS POST ROTATOR CUFF REPAIR: ICD-10-CM

## 2022-01-24 DIAGNOSIS — Z98.890 S/P ARTHROSCOPY OF RIGHT SHOULDER: ICD-10-CM

## 2022-01-24 DIAGNOSIS — Z98.890 STATUS POST SUBACROMIAL DECOMPRESSION: ICD-10-CM

## 2022-01-24 PROCEDURE — 97140 MANUAL THERAPY 1/> REGIONS: CPT

## 2022-01-24 PROCEDURE — 97110 THERAPEUTIC EXERCISES: CPT

## 2022-01-24 NOTE — PROGRESS NOTES
Dx: Status post rotator cuff repair (I67.041)  S/P arthroscopy of right shoulder (H65.703)  Status post subacromial decompression (A32.894)  Authorized # of Visits:  20  Fall Risk: standard         Precautions: Follow protocol    November 30th surgery.

## 2022-01-26 ENCOUNTER — OFFICE VISIT (OUTPATIENT)
Dept: PHYSICAL THERAPY | Age: 57
End: 2022-01-26
Attending: ORTHOPAEDIC SURGERY
Payer: COMMERCIAL

## 2022-01-26 ENCOUNTER — HOSPITAL ENCOUNTER (OUTPATIENT)
Dept: MAMMOGRAPHY | Age: 57
Discharge: HOME | End: 2022-01-26
Attending: FAMILY MEDICINE
Payer: COMMERCIAL

## 2022-01-26 DIAGNOSIS — Z12.31 ENCOUNTER FOR SCREENING MAMMOGRAM FOR MALIGNANT NEOPLASM OF BREAST: ICD-10-CM

## 2022-01-26 DIAGNOSIS — Z98.890 STATUS POST ROTATOR CUFF REPAIR: ICD-10-CM

## 2022-01-26 DIAGNOSIS — Z98.890 S/P ARTHROSCOPY OF RIGHT SHOULDER: ICD-10-CM

## 2022-01-26 DIAGNOSIS — Z98.890 STATUS POST SUBACROMIAL DECOMPRESSION: ICD-10-CM

## 2022-01-26 PROCEDURE — 97140 MANUAL THERAPY 1/> REGIONS: CPT

## 2022-01-26 PROCEDURE — 77067 SCR MAMMO BI INCL CAD: CPT | Performed by: FAMILY MEDICINE

## 2022-01-26 PROCEDURE — 77063 BREAST TOMOSYNTHESIS BI: CPT | Performed by: FAMILY MEDICINE

## 2022-01-26 PROCEDURE — 97110 THERAPEUTIC EXERCISES: CPT

## 2022-01-26 NOTE — PROGRESS NOTES
Dx: Status post rotator cuff repair (Z47.239)  S/P arthroscopy of right shoulder (R75.309)  Status post subacromial decompression (X21.043)  Authorized # of Visits:  20  Fall Risk: standard         Precautions: Follow protocol    November 30th surgery. with gravity assisted strengthening exercises. Plan:   Physical therapy 2x/wk per protocol. Charges: TherEx 2 (25 min);  Manual PT 1 (10 min)      Total Timed Treatment: 40 min  Total Treatment Time: 40 min

## 2022-01-31 ENCOUNTER — OFFICE VISIT (OUTPATIENT)
Dept: PHYSICAL THERAPY | Age: 57
End: 2022-01-31
Attending: ORTHOPAEDIC SURGERY
Payer: COMMERCIAL

## 2022-01-31 DIAGNOSIS — Z98.890 S/P ARTHROSCOPY OF RIGHT SHOULDER: ICD-10-CM

## 2022-01-31 DIAGNOSIS — Z98.890 STATUS POST SUBACROMIAL DECOMPRESSION: ICD-10-CM

## 2022-01-31 DIAGNOSIS — Z98.890 STATUS POST ROTATOR CUFF REPAIR: ICD-10-CM

## 2022-01-31 PROCEDURE — 97110 THERAPEUTIC EXERCISES: CPT

## 2022-01-31 PROCEDURE — 97140 MANUAL THERAPY 1/> REGIONS: CPT

## 2022-01-31 NOTE — PROGRESS NOTES
Dx: Status post rotator cuff repair (B63.960)  S/P arthroscopy of right shoulder (C43.285)  Status post subacromial decompression (D08.660)  Authorized # of Visits:  20  Fall Risk: standard         Precautions: Follow protocol    November 30th surgery. tolerance. PROM and graded oscillations x 15 min for all planes of motion to tolerance. PROM and graded oscillations x 15 min for all planes of motion to tolerance. PROM and graded oscillations x 15 min for all planes of motion to tolerance.      Asse

## 2022-02-02 ENCOUNTER — APPOINTMENT (OUTPATIENT)
Dept: PHYSICAL THERAPY | Age: 57
End: 2022-02-02
Attending: ORTHOPAEDIC SURGERY
Payer: COMMERCIAL

## 2022-02-02 ENCOUNTER — TELEPHONE (OUTPATIENT)
Dept: PHYSICAL THERAPY | Facility: HOSPITAL | Age: 57
End: 2022-02-02

## 2022-02-07 ENCOUNTER — PATIENT MESSAGE (OUTPATIENT)
Dept: ORTHOPEDICS CLINIC | Facility: CLINIC | Age: 57
End: 2022-02-07

## 2022-02-07 ENCOUNTER — OFFICE VISIT (OUTPATIENT)
Dept: PHYSICAL THERAPY | Age: 57
End: 2022-02-07
Attending: ORTHOPAEDIC SURGERY
Payer: COMMERCIAL

## 2022-02-07 ENCOUNTER — PATIENT MESSAGE (OUTPATIENT)
Dept: FAMILY MEDICINE CLINIC | Facility: CLINIC | Age: 57
End: 2022-02-07

## 2022-02-07 DIAGNOSIS — Z98.890 STATUS POST SUBACROMIAL DECOMPRESSION: ICD-10-CM

## 2022-02-07 DIAGNOSIS — Z98.890 S/P ARTHROSCOPY OF RIGHT SHOULDER: ICD-10-CM

## 2022-02-07 DIAGNOSIS — Z98.890 STATUS POST ROTATOR CUFF REPAIR: ICD-10-CM

## 2022-02-07 PROCEDURE — 97140 MANUAL THERAPY 1/> REGIONS: CPT

## 2022-02-07 PROCEDURE — 97110 THERAPEUTIC EXERCISES: CPT

## 2022-02-07 NOTE — PROGRESS NOTES
Dx: Status post rotator cuff repair (J37.038)  S/P arthroscopy of right shoulder (O03.326)  Status post subacromial decompression (V32.167)  Authorized # of Visits:  20  Fall Risk: standard         Precautions: Follow protocol    November 30th surgery. MD: 3/7/2021    Subjective:   Shoulder has been doing well. Some soreness, but more due to decreased activity than anything else. Having increased right posterior lateral elbow pain of unknown origin. Objective:   Treatment per log. PROM >160 with open end feel for flexion. 1/24/2022  TX: 12 1/26/2022  TX: 13 1/31/2022  TX: 14 2/7/2022  TX: 15     TherEx TherEx TherEx TherEx     UBE 6 min 3/3 UBE 6 min 3/3 UBE 6 min 3/3 UBE 6 min 3/3     Rhythmic Stabilization x 20 reps in neutral for FE and ANAID and IR/ER Rhythmic Stabilization x 20 reps in neutral for FE and ANAID and IR/ER Rhythmic Stabilization x 20 reps in neutral for FE and ANAID and IR/ER Rhythmic Stabilization x 20 reps in neutral for FE and ANAID and IR/ER             SL ER/ABD 1# 2x10 SL ER/ABD 1# 2x10 SL ER/ABD 1# 2x10 SL ER/ABD 1# 2x10     Tubing Add/Extn x 20 red Tubing Add/Extn x 20 red Tubing Add/Extn x 20 red Tubing Add/Extn x 20 red     Supine rows yellow x 20 Supine rows yellow x 20  Supine rows yellow x 20       Standing scapular squeezes x 20 Standing scapular squeezes x 20     Manual PT  Manual PT  Manual PT  Manual PT      PROM and graded oscillations x 15 min for all planes of motion to tolerance. PROM and graded oscillations x 15 min for all planes of motion to tolerance. PROM and graded oscillations x 15 min for all planes of motion to tolerance. PROM and graded oscillations x 15 min for all planes of motion to tolerance. Assessment:   Doing well with PROM altogether. Progresses exercises to tolerance. Plan:   Physical therapy 2x/wk per protocol. Charges: TherEx 2 (25 min);  Manual PT 1 (10 min)      Total Timed Treatment: 40 min  Total Treatment Time: 40 min

## 2022-02-08 NOTE — TELEPHONE ENCOUNTER
From: John Cowart  To: Brett Espinoza DO  Sent: 2/7/2022 6:59 PM CST  Subject: Tizanidine    Dr. Emerson Yoon,    Good evening. I would like to know if you would refill my tizanidine as the disk in my back has been giving me issues for the past few weeks and I feel I would benefit from a few good nights sleep.     Thank you,    Meena Nascimento

## 2022-02-08 NOTE — TELEPHONE ENCOUNTER
From: Brody Chen  To: Johnny Nuñez MD  Sent: 2/7/2022 6:53 PM CST  Subject: Return to work    Dr. Rodriguez Langston,     Good evening. I would like to know if you would be able to give me a return to work note? I was hoping to be back to the office on 2/16/22. My next follow up visit with you is scheduled for 3/7. Let me know what I would need to do in order to return by this date.     Thank you,     Jaun Torrez

## 2022-02-09 ENCOUNTER — OFFICE VISIT (OUTPATIENT)
Dept: PHYSICAL THERAPY | Age: 57
End: 2022-02-09
Attending: ORTHOPAEDIC SURGERY
Payer: COMMERCIAL

## 2022-02-09 DIAGNOSIS — Z98.890 STATUS POST ROTATOR CUFF REPAIR: ICD-10-CM

## 2022-02-09 DIAGNOSIS — Z98.890 S/P ARTHROSCOPY OF RIGHT SHOULDER: ICD-10-CM

## 2022-02-09 DIAGNOSIS — Z98.890 STATUS POST SUBACROMIAL DECOMPRESSION: ICD-10-CM

## 2022-02-09 PROCEDURE — 97140 MANUAL THERAPY 1/> REGIONS: CPT

## 2022-02-09 PROCEDURE — 97110 THERAPEUTIC EXERCISES: CPT

## 2022-02-09 NOTE — PROGRESS NOTES
Dx: Status post rotator cuff repair (F82.461)  S/P arthroscopy of right shoulder (I94.030)  Status post subacromial decompression (W09.709)  Authorized # of Visits:  20  Fall Risk: standard         Precautions: Follow protocol    November 30th surgery. MD: 3/7/2021    Subjective:   Shoulder has been doing well. Some soreness, but more due to decreased activity than anything else. Having increased right posterior lateral elbow pain of unknown origin. Objective:   Treatment per log. PROM >160 with open end feel for flexion. 1/24/2022  TX: 12 1/26/2022  TX: 13 1/31/2022  TX: 14 2/7/2022  TX: 15 2/9/2022  TX: 16    TherEx TherEx TherEx TherEx TherEx    UBE 6 min 3/3 UBE 6 min 3/3 UBE 6 min 3/3 UBE 6 min 3/3 UBE 6 min 3/3    Rhythmic Stabilization x 20 reps in neutral for FE and ANAID and IR/ER Rhythmic Stabilization x 20 reps in neutral for FE and ANAID and IR/ER Rhythmic Stabilization x 20 reps in neutral for FE and ANAID and IR/ER Rhythmic Stabilization x 20 reps in neutral for FE and ANAID and IR/ER Rhythmic Stabilization x 20 reps in neutral for FE and ANAID and IR/ER        FWD Raises 1# x 20     SL ER/ABD 1# 2x10 SL ER/ABD 1# 2x10 SL ER/ABD 1# 2x10 SL ER/ABD 1# 2x10 SL ER/ABD 1# 2x10    Tubing Add/Extn x 20 red Tubing Add/Extn x 20 red Tubing Add/Extn x 20 red Tubing Add/Extn x 20 red Tubing Add/Extn x 20 green    Supine rows yellow x 20 Supine rows yellow x 20  Supine rows yellow x 20 Supine rows red x 20      Standing scapular squeezes x 20 Standing scapular squeezes x 20 Standing scapular squeezes x 20    Manual PT  Manual PT  Manual PT  Manual PT  Manual PT    PROM and graded oscillations x 15 min for all planes of motion to tolerance. PROM and graded oscillations x 15 min for all planes of motion to tolerance. PROM and graded oscillations x 15 min for all planes of motion to tolerance. PROM and graded oscillations x 15 min for all planes of motion to tolerance.  PROM and graded oscillations x 15 min for all planes of motion to tolerance. Assessment:   Doing well with PROM altogether. Flexion is ~140 actively. Plan:   Physical therapy 2x/wk per protocol. Charges: TherEx 2 (25 min);  Manual PT 1 (10 min)      Total Timed Treatment: 40 min  Total Treatment Time: 40 min

## 2022-02-10 RX ORDER — TIZANIDINE HYDROCHLORIDE 4 MG/1
4 CAPSULE, GELATIN COATED ORAL 2 TIMES DAILY PRN
Qty: 30 CAPSULE | Refills: 0 | Status: SHIPPED | OUTPATIENT
Start: 2022-02-10

## 2022-02-14 ENCOUNTER — OFFICE VISIT (OUTPATIENT)
Dept: PHYSICAL THERAPY | Age: 57
End: 2022-02-14
Attending: ORTHOPAEDIC SURGERY
Payer: COMMERCIAL

## 2022-02-14 DIAGNOSIS — Z98.890 STATUS POST SUBACROMIAL DECOMPRESSION: ICD-10-CM

## 2022-02-14 DIAGNOSIS — Z98.890 S/P ARTHROSCOPY OF RIGHT SHOULDER: ICD-10-CM

## 2022-02-14 DIAGNOSIS — Z98.890 STATUS POST ROTATOR CUFF REPAIR: ICD-10-CM

## 2022-02-14 PROCEDURE — 97110 THERAPEUTIC EXERCISES: CPT

## 2022-02-14 PROCEDURE — 97140 MANUAL THERAPY 1/> REGIONS: CPT

## 2022-02-14 NOTE — PROGRESS NOTES
Dx: Status post rotator cuff repair (Q88.776)  S/P arthroscopy of right shoulder (C14.367)  Status post subacromial decompression (E86.904)  Authorized # of Visits:  20  Fall Risk: standard         Precautions: Follow protocol    November 30th surgery. MD: 3/7/2021    Subjective:   Shoulder has been doing well. No new complaints today. States that she noted increased weakness with pouring coffee at home. Objective:   Treatment per log. PROM >160 with open end feel for flexion. 1/24/2022  TX: 12 1/26/2022  TX: 13 1/31/2022  TX: 14 2/7/2022  TX: 15 2/9/2022  TX: 16 2/14/2022  TX: 17   TherEx TherEx TherEx TherEx TherEx TherEx   UBE 6 min 3/3 UBE 6 min 3/3 UBE 6 min 3/3 UBE 6 min 3/3 UBE 6 min 3/3 UBE 6 min 3/3   Rhythmic Stabilization x 20 reps in neutral for FE and ANAID and IR/ER Rhythmic Stabilization x 20 reps in neutral for FE and ANAID and IR/ER Rhythmic Stabilization x 20 reps in neutral for FE and ANAID and IR/ER Rhythmic Stabilization x 20 reps in neutral for FE and ANAID and IR/ER Rhythmic Stabilization x 20 reps in neutral for FE and ANAID and IR/ER Rhythmic Stabilization x 20 reps in neutral for FE and ANAID and IR/ER       FWD Raises 1# x 20  FWD Raises 1# x 20   SL ER/ABD 1# 2x10 SL ER/ABD 1# 2x10 SL ER/ABD 1# 2x10 SL ER/ABD 1# 2x10 SL ER/ABD 1# 2x10 SL ER/ABD 1# 2x10   Tubing Add/Extn x 20 red Tubing Add/Extn x 20 red Tubing Add/Extn x 20 red Tubing Add/Extn x 20 red Tubing Add/Extn x 20 green Cab Col 24# x 20 ADD? EXTN   Supine rows yellow x 20 Supine rows yellow x 20  Supine rows yellow x 20 Supine rows red x 20 Supine rows green x 20     Standing scapular squeezes x 20 Standing scapular squeezes x 20 Standing scapular squeezes x 20 Scapular \"w\"yellow x 20        3 way shoulder 5 x 2           Manual PT  Manual PT  Manual PT  Manual PT  Manual PT Manual PT   PROM and graded oscillations x 15 min for all planes of motion to tolerance.    PROM and graded oscillations x 15 min for all planes of motion to tolerance. PROM and graded oscillations x 15 min for all planes of motion to tolerance. PROM and graded oscillations x 15 min for all planes of motion to tolerance. PROM and graded oscillations x 15 min for all planes of motion to tolerance. PROM and graded oscillations x 15 min for all planes of motion to tolerance. Assessment:   Doing well with PROM altogether. Flexion is ~ 152 degrees AROM. Responded well with exercise progressions today. Some fatigue with active resisted elevation. Plan:   Physical therapy 2x/wk per protocol. Charges: TherEx 2 (25 min);  Manual PT 1 (10 min)      Total Timed Treatment: 40 min  Total Treatment Time: 40 min

## 2022-02-16 ENCOUNTER — OFFICE VISIT (OUTPATIENT)
Dept: PHYSICAL THERAPY | Age: 57
End: 2022-02-16
Attending: ORTHOPAEDIC SURGERY
Payer: COMMERCIAL

## 2022-02-16 DIAGNOSIS — Z98.890 STATUS POST ROTATOR CUFF REPAIR: ICD-10-CM

## 2022-02-16 DIAGNOSIS — Z98.890 S/P ARTHROSCOPY OF RIGHT SHOULDER: ICD-10-CM

## 2022-02-16 DIAGNOSIS — Z98.890 STATUS POST SUBACROMIAL DECOMPRESSION: ICD-10-CM

## 2022-02-16 PROCEDURE — 97140 MANUAL THERAPY 1/> REGIONS: CPT

## 2022-02-16 PROCEDURE — 97110 THERAPEUTIC EXERCISES: CPT

## 2022-02-16 NOTE — PROGRESS NOTES
Dx: Status post rotator cuff repair (Z48.582)  S/P arthroscopy of right shoulder (L01.598)  Status post subacromial decompression (Z82.306)  Authorized # of Visits:  20  Fall Risk: standard         Precautions: Follow protocol    November 30th surgery. MD: 3/7/2021    Subjective:   Back to work today and experienced some achyness as she sat at her desk throughout the day. Objective:   Treatment per log. PROM >160 with open end feel for flexion. 2/7/2022  TX: 15 2/9/2022  TX: 16 2/14/2022  TX: 17 2/16/2022  TX: 18     TherEx TherEx TherEx TherEx     UBE 6 min 3/3 UBE 6 min 3/3 UBE 6 min 3/3 UBE 6 min 3/3     Rhythmic Stabilization x 20 reps in neutral for FE and ANAID and IR/ER Rhythmic Stabilization x 20 reps in neutral for FE and ANAID and IR/ER Rhythmic Stabilization x 20 reps in neutral for FE and ANAID and IR/ER Rhythmic Stabilization x 20 reps in neutral for FE and ANAID and IR/ER      FWD Raises 1# x 20  FWD Raises 1# x 20 FWD Raises 1# x 20     SL ER/ABD 1# 2x10 SL ER/ABD 1# 2x10 SL ER/ABD 1# 2x10 SL ER/ABD 1# 2x10     Tubing Add/Extn x 20 red Tubing Add/Extn x 20 green Cab Col 24# x 20 ADD? EXTN Cab Col 24# x 20 ADD/EXTN     Supine rows yellow x 20 Supine rows red x 20 Supine rows green x 20 Supine rows green x 20     Standing scapular squeezes x 20 Standing scapular squeezes x 20 Scapular \"w\"yellow x 20 Scapular \"w\"yellow x 20       3 way shoulder 5 x 2 3 way shoulder 5 x 2             Manual PT  Manual PT Manual PT Manual PT     PROM and graded oscillations x 15 min for all planes of motion to tolerance. PROM and graded oscillations x 15 min for all planes of motion to tolerance. PROM and graded oscillations x 15 min for all planes of motion to tolerance. PROM and graded oscillations x 15 min for all planes of motion to tolerance. Assessment:   Doing well with PROM altogether. Flexion is ~ 150 degrees AROM. Strength is progressing without increased pain against gravity.    Plan:   Physical therapy 2x/wk per protocol. Charges: TherEx 2 (25 min);  Manual PT 1 (10 min)      Total Timed Treatment: 40 min  Total Treatment Time: 40 min

## 2022-02-17 RX ORDER — TIZANIDINE HYDROCHLORIDE 4 MG/1
4 CAPSULE, GELATIN COATED ORAL 2 TIMES DAILY PRN
Qty: 30 CAPSULE | Refills: 0 | OUTPATIENT
Start: 2022-02-17

## 2022-02-21 ENCOUNTER — OFFICE VISIT (OUTPATIENT)
Dept: PHYSICAL THERAPY | Age: 57
End: 2022-02-21
Attending: ORTHOPAEDIC SURGERY
Payer: COMMERCIAL

## 2022-02-21 DIAGNOSIS — Z98.890 S/P ARTHROSCOPY OF RIGHT SHOULDER: ICD-10-CM

## 2022-02-21 DIAGNOSIS — Z98.890 STATUS POST ROTATOR CUFF REPAIR: ICD-10-CM

## 2022-02-21 DIAGNOSIS — Z98.890 STATUS POST SUBACROMIAL DECOMPRESSION: ICD-10-CM

## 2022-02-21 PROCEDURE — 97140 MANUAL THERAPY 1/> REGIONS: CPT

## 2022-02-21 PROCEDURE — 97110 THERAPEUTIC EXERCISES: CPT

## 2022-02-21 NOTE — PROGRESS NOTES
Dx: Status post rotator cuff repair (I78.269)  S/P arthroscopy of right shoulder (N95.123)  Status post subacromial decompression (D56.801)  Authorized # of Visits:  20  Fall Risk: standard         Precautions: Follow protocol    November 30th surgery. MD: 3/7/2021    Subjective:   States that the shoulder has improved and is able to complete most all activities related to work. Objective:   Treatment per log. PROM >160 with open end feel for flexion. 2/7/2022  TX: 15 2/9/2022  TX: 16 2/14/2022  TX: 17 2/16/2022  TX: 18 2/21/2022  TX: 19    TherEx TherEx TherEx TherEx TherEx    UBE 6 min 3/3 UBE 6 min 3/3 UBE 6 min 3/3 UBE 6 min 3/3 UBE 6 min 3/3    Rhythmic Stabilization x 20 reps in neutral for FE and ANAID and IR/ER Rhythmic Stabilization x 20 reps in neutral for FE and ANAID and IR/ER Rhythmic Stabilization x 20 reps in neutral for FE and ANAID and IR/ER Rhythmic Stabilization x 20 reps in neutral for FE and ANAID and IR/ER Rhythmic Stabilization x 20 reps in neutral for FE and ANAID and IR/ER     FWD Raises 1# x 20  FWD Raises 1# x 20 FWD Raises 1# x 20 FWD Raises 1# x 20    SL ER/ABD 1# 2x10 SL ER/ABD 1# 2x10 SL ER/ABD 1# 2x10 SL ER/ABD 1# 2x10 SL ER 2#/ABD 1# 2x10    Tubing Add/Extn x 20 red Tubing Add/Extn x 20 green Cab Col 24# x 20 ADD? EXTN Cab Col 24# x 20 ADD/EXTN Cab Col 24# x 20 ADD/EXTN    Supine rows yellow x 20 Supine rows red x 20 Supine rows green x 20 Supine rows green x 20 Supine rows green x 20    Standing scapular squeezes x 20 Standing scapular squeezes x 20 Scapular \"w\"yellow x 20 Scapular \"w\"yellow x 20 Scapular \"w\"yellow x 20      3 way shoulder 5 x 2 3 way shoulder 5 x 2 3 way shoulder 5 x 2        Alphabet ball on wall x 2                     Manual PT  Manual PT Manual PT Manual PT Manual PT    PROM and graded oscillations x 15 min for all planes of motion to tolerance. PROM and graded oscillations x 15 min for all planes of motion to tolerance.  PROM and graded oscillations x 15 min for all planes of motion to tolerance. PROM and graded oscillations x 15 min for all planes of motion to tolerance. PROM and graded oscillations x 15 min for all planes of motion to tolerance. Assessment:   Doing well with PROM altogether. Strength is progressing gradually. Scapular strength is increasing. Plan:   Physical therapy 2x/wk per protocol. Charges: TherEx 2 (25 min);  Manual PT 1 (10 min)      Total Timed Treatment: 40 min  Total Treatment Time: 40 min

## 2022-02-23 ENCOUNTER — OFFICE VISIT (OUTPATIENT)
Dept: PHYSICAL THERAPY | Age: 57
End: 2022-02-23
Attending: ORTHOPAEDIC SURGERY
Payer: COMMERCIAL

## 2022-02-23 DIAGNOSIS — Z98.890 S/P ARTHROSCOPY OF RIGHT SHOULDER: ICD-10-CM

## 2022-02-23 DIAGNOSIS — Z98.890 STATUS POST ROTATOR CUFF REPAIR: ICD-10-CM

## 2022-02-23 DIAGNOSIS — Z98.890 STATUS POST SUBACROMIAL DECOMPRESSION: ICD-10-CM

## 2022-02-23 PROCEDURE — 97140 MANUAL THERAPY 1/> REGIONS: CPT

## 2022-02-23 PROCEDURE — 97110 THERAPEUTIC EXERCISES: CPT

## 2022-02-23 NOTE — PROGRESS NOTES
Dx: Status post rotator cuff repair (E26.253)  S/P arthroscopy of right shoulder (E89.513)  Status post subacromial decompression (S36.732)  Authorized # of Visits:  20  Fall Risk: standard         Precautions: Follow protocol    November 30th surgery. MD: 3/7/2021    Subjective:   States that the shoulder is doing well today. Increased mobility with elevation. Able to complete work tasks without too much challenge. Objective:   Treatment per log. PROM >160 with open end feel for flexion. 2/7/2022  TX: 15 2/9/2022  TX: 16 2/14/2022  TX: 17 2/16/2022  TX: 18 2/21/2022  TX: 19 2/23/2022  TX:20   TherEx TherEx TherEx TherEx TherEx TherEx   UBE 6 min 3/3 UBE 6 min 3/3 UBE 6 min 3/3 UBE 6 min 3/3 UBE 6 min 3/3 UBE 6 min 3/3   Rhythmic Stabilization x 20 reps in neutral for FE and ANAID and IR/ER Rhythmic Stabilization x 20 reps in neutral for FE and ANAID and IR/ER Rhythmic Stabilization x 20 reps in neutral for FE and ANAID and IR/ER Rhythmic Stabilization x 20 reps in neutral for FE and ANAID and IR/ER Rhythmic Stabilization x 20 reps in neutral for FE and ANAID and IR/ER Rhythmic Stabilization x 20 reps in neutral for FE and ANAID and IR/ER    FWD Raises 1# x 20  FWD Raises 1# x 20 FWD Raises 1# x 20 FWD Raises 1# x 20 FWD Raises 2# x 20   SL ER/ABD 1# 2x10 SL ER/ABD 1# 2x10 SL ER/ABD 1# 2x10 SL ER/ABD 1# 2x10 SL ER 2#/ABD 1# 2x10 SL ER 2#/ABD 1# 2x10   Tubing Add/Extn x 20 red Tubing Add/Extn x 20 green Cab Col 24# x 20 ADD? EXTN Cab Col 24# x 20 ADD/EXTN Cab Col 24# x 20 ADD/EXTN Cab Col 24# x 20 ADD/EXTN   Supine rows yellow x 20 Supine rows red x 20 Supine rows green x 20 Supine rows green x 20 Supine rows green x 20 Supine rows green x 20   Standing scapular squeezes x 20 Standing scapular squeezes x 20 Scapular \"w\"yellow x 20 Scapular \"w\"yellow x 20 Scapular \"w\"yellow x 20 Scapular \"w\"yellow x 20     3 way shoulder 5 x 2 3 way shoulder 5 x 2 3 way shoulder 5 x 2 3 way shoulder 5 x 2       Alphabet ball on wall x 2  Cane extension, IR, ADD 2 x 10                   Manual PT  Manual PT Manual PT Manual PT Manual PT Manual PT   PROM and graded oscillations x 15 min for all planes of motion to tolerance. PROM and graded oscillations x 15 min for all planes of motion to tolerance. PROM and graded oscillations x 15 min for all planes of motion to tolerance. PROM and graded oscillations x 15 min for all planes of motion to tolerance. PROM and graded oscillations x 15 min for all planes of motion to tolerance. PROM and graded oscillations x 15 min for all planes of motion to tolerance. Assessment:   Doing well with PROM altogether. Strength is progressing gradually. Scapular strength is increasing. Plan:   Physical therapy 2x/wk per protocol. Charges: TherEx 2 (25 min);  Manual PT 1 (10 min)      Total Timed Treatment: 40 min  Total Treatment Time: 40 min

## 2022-03-02 ENCOUNTER — OFFICE VISIT (OUTPATIENT)
Dept: FAMILY MEDICINE CLINIC | Facility: CLINIC | Age: 57
End: 2022-03-02
Payer: COMMERCIAL

## 2022-03-02 VITALS
TEMPERATURE: 98 F | WEIGHT: 219 LBS | HEIGHT: 65 IN | BODY MASS INDEX: 36.49 KG/M2 | HEART RATE: 90 BPM | DIASTOLIC BLOOD PRESSURE: 82 MMHG | OXYGEN SATURATION: 99 % | SYSTOLIC BLOOD PRESSURE: 130 MMHG

## 2022-03-02 DIAGNOSIS — F33.41 RECURRENT MAJOR DEPRESSIVE DISORDER IN PARTIAL REMISSION (HCC): ICD-10-CM

## 2022-03-02 DIAGNOSIS — Z23 NEED FOR VACCINATION: ICD-10-CM

## 2022-03-02 DIAGNOSIS — E66.01 CLASS 2 SEVERE OBESITY DUE TO EXCESS CALORIES WITH SERIOUS COMORBIDITY AND BODY MASS INDEX (BMI) OF 36.0 TO 36.9 IN ADULT (HCC): ICD-10-CM

## 2022-03-02 DIAGNOSIS — E78.00 HYPERCHOLESTEROLEMIA: ICD-10-CM

## 2022-03-02 DIAGNOSIS — R73.03 PREDIABETES: ICD-10-CM

## 2022-03-02 DIAGNOSIS — Z79.899 HIGH RISK MEDICATION USE: ICD-10-CM

## 2022-03-02 DIAGNOSIS — F90.0 ATTENTION DEFICIT HYPERACTIVITY DISORDER (ADHD), PREDOMINANTLY INATTENTIVE TYPE: ICD-10-CM

## 2022-03-02 DIAGNOSIS — Z51.81 THERAPEUTIC DRUG MONITORING: Primary | ICD-10-CM

## 2022-03-02 PROBLEM — E66.812 CLASS 2 SEVERE OBESITY DUE TO EXCESS CALORIES WITH SERIOUS COMORBIDITY AND BODY MASS INDEX (BMI) OF 36.0 TO 36.9 IN ADULT (HCC): Status: ACTIVE | Noted: 2022-03-02

## 2022-03-02 PROCEDURE — 3079F DIAST BP 80-89 MM HG: CPT | Performed by: FAMILY MEDICINE

## 2022-03-02 PROCEDURE — 90750 HZV VACC RECOMBINANT IM: CPT | Performed by: FAMILY MEDICINE

## 2022-03-02 PROCEDURE — 99214 OFFICE O/P EST MOD 30 MIN: CPT | Performed by: FAMILY MEDICINE

## 2022-03-02 PROCEDURE — 3008F BODY MASS INDEX DOCD: CPT | Performed by: FAMILY MEDICINE

## 2022-03-02 PROCEDURE — 3075F SYST BP GE 130 - 139MM HG: CPT | Performed by: FAMILY MEDICINE

## 2022-03-02 PROCEDURE — 90471 IMMUNIZATION ADMIN: CPT | Performed by: FAMILY MEDICINE

## 2022-03-02 RX ORDER — BUPROPION HYDROCHLORIDE 150 MG/1
450 TABLET ORAL EVERY MORNING
Qty: 270 TABLET | Refills: 1 | Status: SHIPPED | OUTPATIENT
Start: 2022-03-02

## 2022-03-02 RX ORDER — MELOXICAM 15 MG/1
15 TABLET ORAL DAILY
COMMUNITY
Start: 2022-02-18

## 2022-03-03 ENCOUNTER — OFFICE VISIT (OUTPATIENT)
Dept: OBGYN CLINIC | Facility: CLINIC | Age: 57
End: 2022-03-03
Payer: COMMERCIAL

## 2022-03-03 VITALS — BODY MASS INDEX: 36.72 KG/M2 | HEIGHT: 65 IN | WEIGHT: 220.38 LBS | HEART RATE: 94 BPM

## 2022-03-03 DIAGNOSIS — Z12.4 CERVICAL CANCER SCREENING: ICD-10-CM

## 2022-03-03 DIAGNOSIS — Z01.419 WELL WOMAN EXAM WITH ROUTINE GYNECOLOGICAL EXAM: Primary | ICD-10-CM

## 2022-03-03 DIAGNOSIS — N95.1 MENOPAUSAL SYMPTOMS: ICD-10-CM

## 2022-03-03 PROCEDURE — 99396 PREV VISIT EST AGE 40-64: CPT | Performed by: NURSE PRACTITIONER

## 2022-03-03 PROCEDURE — 3008F BODY MASS INDEX DOCD: CPT | Performed by: NURSE PRACTITIONER

## 2022-03-03 PROCEDURE — 87624 HPV HI-RISK TYP POOLED RSLT: CPT | Performed by: NURSE PRACTITIONER

## 2022-03-03 NOTE — PATIENT INSTRUCTIONS
Dr. Kamar Meyer, [place], Ashwagandha Root- check with pharmacist prior to use    Vaginal Hyaluronic Acid products

## 2022-03-06 ENCOUNTER — PATIENT MESSAGE (OUTPATIENT)
Dept: FAMILY MEDICINE CLINIC | Facility: CLINIC | Age: 57
End: 2022-03-06

## 2022-03-07 ENCOUNTER — OFFICE VISIT (OUTPATIENT)
Dept: ORTHOPEDICS CLINIC | Facility: CLINIC | Age: 57
End: 2022-03-07
Payer: COMMERCIAL

## 2022-03-07 ENCOUNTER — TELEPHONE (OUTPATIENT)
Dept: FAMILY MEDICINE CLINIC | Facility: CLINIC | Age: 57
End: 2022-03-07

## 2022-03-07 DIAGNOSIS — Z98.890 S/P ARTHROSCOPY OF SHOULDER: Primary | ICD-10-CM

## 2022-03-07 PROCEDURE — 99213 OFFICE O/P EST LOW 20 MIN: CPT | Performed by: ORTHOPAEDIC SURGERY

## 2022-03-07 NOTE — TELEPHONE ENCOUNTER
PA request for Maren Johnson (Alexandra: [id number])    Your information has been sent to [facility].

## 2022-03-07 NOTE — TELEPHONE ENCOUNTER
From: [facility]  To: Daria Lanier, DO  Sent: 3/6/2022 3:16 PM CST  Subject: Vyvanse refill - insurance authorization    Dr. Enzo Chi,    [facility] informed me that my Vyvanse refill has been halted by the insurance company requesting an authorization letter from you. Let me know if you need anything from me.     Sukhjinder Calderon

## 2022-03-18 LAB
HPV I/H RISK 1 DNA SPEC QL NAA+PROBE: NEGATIVE
LAST PAP RESULT: NORMAL

## 2022-06-07 ENCOUNTER — OFFICE VISIT (OUTPATIENT)
Dept: FAMILY MEDICINE CLINIC | Facility: CLINIC | Age: 57
End: 2022-06-07
Payer: COMMERCIAL

## 2022-06-07 VITALS
RESPIRATION RATE: 16 BRPM | SYSTOLIC BLOOD PRESSURE: 132 MMHG | HEIGHT: 65 IN | DIASTOLIC BLOOD PRESSURE: 80 MMHG | HEART RATE: 105 BPM | OXYGEN SATURATION: 99 % | WEIGHT: 220 LBS | BODY MASS INDEX: 36.65 KG/M2 | TEMPERATURE: 98 F

## 2022-06-07 DIAGNOSIS — F90.0 ATTENTION DEFICIT HYPERACTIVITY DISORDER (ADHD), PREDOMINANTLY INATTENTIVE TYPE: ICD-10-CM

## 2022-06-07 DIAGNOSIS — Z51.81 THERAPEUTIC DRUG MONITORING: ICD-10-CM

## 2022-06-07 DIAGNOSIS — M47.816 OSTEOARTHRITIS OF LUMBAR SPINE, UNSPECIFIED SPINAL OSTEOARTHRITIS COMPLICATION STATUS: ICD-10-CM

## 2022-06-07 DIAGNOSIS — F33.41 RECURRENT MAJOR DEPRESSIVE DISORDER IN PARTIAL REMISSION (HCC): ICD-10-CM

## 2022-06-07 DIAGNOSIS — M54.50 LOW BACK PAIN, UNSPECIFIED BACK PAIN LATERALITY, UNSPECIFIED CHRONICITY, UNSPECIFIED WHETHER SCIATICA PRESENT: Primary | ICD-10-CM

## 2022-06-07 DIAGNOSIS — E66.09 CLASS 2 OBESITY DUE TO EXCESS CALORIES WITHOUT SERIOUS COMORBIDITY WITH BODY MASS INDEX (BMI) OF 36.0 TO 36.9 IN ADULT: ICD-10-CM

## 2022-06-07 PROCEDURE — 3079F DIAST BP 80-89 MM HG: CPT | Performed by: FAMILY MEDICINE

## 2022-06-07 PROCEDURE — 3075F SYST BP GE 130 - 139MM HG: CPT | Performed by: FAMILY MEDICINE

## 2022-06-07 PROCEDURE — 3008F BODY MASS INDEX DOCD: CPT | Performed by: FAMILY MEDICINE

## 2022-06-07 PROCEDURE — 99213 OFFICE O/P EST LOW 20 MIN: CPT | Performed by: FAMILY MEDICINE

## 2022-06-07 RX ORDER — BUPROPION HYDROCHLORIDE 150 MG/1
450 TABLET ORAL EVERY MORNING
Qty: 270 TABLET | Refills: 1 | Status: SHIPPED | OUTPATIENT
Start: 2022-06-07

## 2022-06-07 RX ORDER — GARLIC 180 MG
TABLET, DELAYED RELEASE (ENTERIC COATED) ORAL
COMMUNITY

## 2022-06-07 RX ORDER — MELOXICAM 15 MG/1
15 TABLET ORAL DAILY PRN
Qty: 90 TABLET | Refills: 0 | Status: SHIPPED | OUTPATIENT
Start: 2022-06-07 | End: 2022-09-05

## 2022-07-21 ENCOUNTER — OFFICE VISIT (OUTPATIENT)
Dept: OBGYN CLINIC | Facility: CLINIC | Age: 57
End: 2022-07-21
Payer: COMMERCIAL

## 2022-07-21 VITALS
HEIGHT: 65 IN | HEART RATE: 95 BPM | BODY MASS INDEX: 36.93 KG/M2 | DIASTOLIC BLOOD PRESSURE: 86 MMHG | SYSTOLIC BLOOD PRESSURE: 134 MMHG | WEIGHT: 221.63 LBS

## 2022-07-21 DIAGNOSIS — N76.0 VAGINITIS AND VULVOVAGINITIS: Primary | ICD-10-CM

## 2022-07-21 DIAGNOSIS — N94.11 INTROITAL DYSPAREUNIA: ICD-10-CM

## 2022-07-21 PROCEDURE — 99213 OFFICE O/P EST LOW 20 MIN: CPT | Performed by: NURSE PRACTITIONER

## 2022-07-21 PROCEDURE — 87480 CANDIDA DNA DIR PROBE: CPT | Performed by: NURSE PRACTITIONER

## 2022-07-21 PROCEDURE — 87510 GARDNER VAG DNA DIR PROBE: CPT | Performed by: NURSE PRACTITIONER

## 2022-07-21 PROCEDURE — 3075F SYST BP GE 130 - 139MM HG: CPT | Performed by: NURSE PRACTITIONER

## 2022-07-21 PROCEDURE — 87660 TRICHOMONAS VAGIN DIR PROBE: CPT | Performed by: NURSE PRACTITIONER

## 2022-07-21 PROCEDURE — 3079F DIAST BP 80-89 MM HG: CPT | Performed by: NURSE PRACTITIONER

## 2022-07-21 PROCEDURE — 3008F BODY MASS INDEX DOCD: CPT | Performed by: NURSE PRACTITIONER

## 2022-07-26 ENCOUNTER — PATIENT MESSAGE (OUTPATIENT)
Dept: OBGYN CLINIC | Facility: CLINIC | Age: 57
End: 2022-07-26

## 2022-07-26 NOTE — TELEPHONE ENCOUNTER
From: Giacomo Cowart  To: KE Olivera  Sent: 7/26/2022 12:56 PM CDT  Subject: Question regarding VAGINITIS/VAGINOSIS, DNA PROBE    Sara Acevedo you able to call that script into [facility]?    As of yesterday, they did not have it.     Sherrill

## 2022-07-27 RX ORDER — METRONIDAZOLE 500 MG/1
500 TABLET ORAL 2 TIMES DAILY
Qty: 14 TABLET | Refills: 0 | Status: SHIPPED | OUTPATIENT
Start: 2022-07-27 | End: 2022-08-03

## 2022-08-12 NOTE — PROGRESS NOTES
SHOULDER EVALUATION:   Referring Physician: Dr. Hammond Child  Diagnosis: Status post rotator cuff repair (D25.837)  S/P arthroscopy of right shoulder (A55.230)  Status post subacromial decompression (Y27.299)       Date of Service: 12/6/2021     PATIENT SUMMAR OBJECTIVE:   Observation/Posture: Presents with right UE in sling. There is increased kyphosis, increased protraction right shoulder versus left, FH  Palpation: No acute tenderness.  Surgical incisions were covered, no drainage noted    Sensation: touch i x/week or a total of 20 visits over a 90 day period. Treatment will include:  Therapeutic Exercise    Education or treatment limitation: None  Rehab Potential:good    FOTO: 44/100    Patient/Family/Caregiver was advised of these findings, precautions, and t Opioid Pregnancy And Lactation Text: These medications can lead to premature delivery and should be avoided during pregnancy. These medications are also present in breast milk in small amounts.

## 2022-09-02 DIAGNOSIS — M47.816 OSTEOARTHRITIS OF LUMBAR SPINE, UNSPECIFIED SPINAL OSTEOARTHRITIS COMPLICATION STATUS: ICD-10-CM

## 2022-09-02 DIAGNOSIS — M54.50 LOW BACK PAIN, UNSPECIFIED BACK PAIN LATERALITY, UNSPECIFIED CHRONICITY, UNSPECIFIED WHETHER SCIATICA PRESENT: ICD-10-CM

## 2022-09-02 RX ORDER — MELOXICAM 15 MG/1
TABLET ORAL
Qty: 90 TABLET | Refills: 0 | Status: SHIPPED | OUTPATIENT
Start: 2022-09-02

## 2022-12-02 ENCOUNTER — OFFICE VISIT (OUTPATIENT)
Dept: ORTHOPEDICS CLINIC | Facility: CLINIC | Age: 57
End: 2022-12-02
Payer: COMMERCIAL

## 2022-12-02 DIAGNOSIS — Z98.890 S/P ARTHROSCOPY OF SHOULDER: Primary | ICD-10-CM

## 2022-12-02 PROCEDURE — 99213 OFFICE O/P EST LOW 20 MIN: CPT | Performed by: ORTHOPAEDIC SURGERY

## 2022-12-11 DIAGNOSIS — M54.50 LOW BACK PAIN, UNSPECIFIED BACK PAIN LATERALITY, UNSPECIFIED CHRONICITY, UNSPECIFIED WHETHER SCIATICA PRESENT: ICD-10-CM

## 2022-12-11 DIAGNOSIS — M47.816 OSTEOARTHRITIS OF LUMBAR SPINE, UNSPECIFIED SPINAL OSTEOARTHRITIS COMPLICATION STATUS: ICD-10-CM

## 2022-12-13 RX ORDER — MELOXICAM 15 MG/1
TABLET ORAL
Qty: 90 TABLET | Refills: 0 | Status: SHIPPED | OUTPATIENT
Start: 2022-12-13

## 2023-01-31 ENCOUNTER — HOSPITAL ENCOUNTER (OUTPATIENT)
Dept: MAMMOGRAPHY | Age: 58
Discharge: HOME | End: 2023-01-31
Attending: OBSTETRICS & GYNECOLOGY
Payer: COMMERCIAL

## 2023-01-31 DIAGNOSIS — Z12.31 ENCOUNTER FOR SCREENING MAMMOGRAM FOR MALIGNANT NEOPLASM OF BREAST: ICD-10-CM

## 2023-01-31 PROCEDURE — 77063 BREAST TOMOSYNTHESIS BI: CPT | Performed by: OBSTETRICS & GYNECOLOGY

## 2023-01-31 PROCEDURE — 77067 SCR MAMMO BI INCL CAD: CPT | Performed by: OBSTETRICS & GYNECOLOGY

## 2023-02-01 DIAGNOSIS — Z51.81 THERAPEUTIC DRUG MONITORING: ICD-10-CM

## 2023-02-01 DIAGNOSIS — F33.41 RECURRENT MAJOR DEPRESSIVE DISORDER IN PARTIAL REMISSION (HCC): ICD-10-CM

## 2023-02-01 RX ORDER — BUPROPION HYDROCHLORIDE 150 MG/1
TABLET ORAL
Qty: 270 TABLET | Refills: 1 | Status: SHIPPED | OUTPATIENT
Start: 2023-02-01

## 2023-02-24 ENCOUNTER — OFFICE VISIT (OUTPATIENT)
Dept: FAMILY MEDICINE CLINIC | Facility: CLINIC | Age: 58
End: 2023-02-24
Payer: COMMERCIAL

## 2023-02-24 VITALS
RESPIRATION RATE: 18 BRPM | HEIGHT: 65 IN | SYSTOLIC BLOOD PRESSURE: 142 MMHG | TEMPERATURE: 97 F | WEIGHT: 220 LBS | OXYGEN SATURATION: 98 % | DIASTOLIC BLOOD PRESSURE: 84 MMHG | BODY MASS INDEX: 36.65 KG/M2 | HEART RATE: 85 BPM

## 2023-02-24 DIAGNOSIS — J11.1 INFLUENZA-LIKE ILLNESS: Primary | ICD-10-CM

## 2023-02-24 PROCEDURE — 3077F SYST BP >= 140 MM HG: CPT | Performed by: NURSE PRACTITIONER

## 2023-02-24 PROCEDURE — 3079F DIAST BP 80-89 MM HG: CPT | Performed by: NURSE PRACTITIONER

## 2023-02-24 PROCEDURE — 99213 OFFICE O/P EST LOW 20 MIN: CPT | Performed by: NURSE PRACTITIONER

## 2023-02-24 PROCEDURE — 3008F BODY MASS INDEX DOCD: CPT | Performed by: NURSE PRACTITIONER

## 2023-02-24 RX ORDER — BENZONATATE 200 MG/1
200 CAPSULE ORAL 3 TIMES DAILY PRN
Qty: 30 CAPSULE | Refills: 0 | Status: SHIPPED | OUTPATIENT
Start: 2023-02-24

## 2023-03-29 ENCOUNTER — PATIENT MESSAGE (OUTPATIENT)
Dept: FAMILY MEDICINE CLINIC | Facility: CLINIC | Age: 58
End: 2023-03-29

## 2023-03-29 DIAGNOSIS — F90.0 ATTENTION DEFICIT HYPERACTIVITY DISORDER (ADHD), PREDOMINANTLY INATTENTIVE TYPE: ICD-10-CM

## 2023-06-17 ENCOUNTER — LAB ENCOUNTER (OUTPATIENT)
Dept: LAB | Age: 58
End: 2023-06-17
Attending: FAMILY MEDICINE
Payer: COMMERCIAL

## 2023-06-17 DIAGNOSIS — R73.03 PREDIABETES: ICD-10-CM

## 2023-06-17 DIAGNOSIS — E78.00 HYPERCHOLESTEROLEMIA: ICD-10-CM

## 2023-06-17 DIAGNOSIS — Z00.00 LABORATORY EXAM ORDERED AS PART OF ROUTINE GENERAL MEDICAL EXAMINATION: ICD-10-CM

## 2023-06-17 LAB
ALBUMIN SERPL-MCNC: 2.9 G/DL (ref 3.4–5)
ALBUMIN/GLOB SERPL: 0.7 {RATIO} (ref 1–2)
ALP LIVER SERPL-CCNC: 144 U/L
ALT SERPL-CCNC: 128 U/L
ANION GAP SERPL CALC-SCNC: 5 MMOL/L (ref 0–18)
AST SERPL-CCNC: 64 U/L (ref 15–37)
BASOPHILS # BLD AUTO: 0.06 X10(3) UL (ref 0–0.2)
BASOPHILS NFR BLD AUTO: 1 %
BILIRUB SERPL-MCNC: 0.3 MG/DL (ref 0.1–2)
BUN BLD-MCNC: 17 MG/DL (ref 7–18)
CALCIUM BLD-MCNC: 9 MG/DL (ref 8.5–10.1)
CHLORIDE SERPL-SCNC: 110 MMOL/L (ref 98–112)
CHOLEST SERPL-MCNC: 188 MG/DL (ref ?–200)
CO2 SERPL-SCNC: 25 MMOL/L (ref 21–32)
CREAT BLD-MCNC: 0.76 MG/DL
EOSINOPHIL # BLD AUTO: 0.11 X10(3) UL (ref 0–0.7)
EOSINOPHIL NFR BLD AUTO: 1.9 %
ERYTHROCYTE [DISTWIDTH] IN BLOOD BY AUTOMATED COUNT: 14.6 %
EST. AVERAGE GLUCOSE BLD GHB EST-MCNC: 128 MG/DL (ref 68–126)
FASTING PATIENT LIPID ANSWER: YES
FASTING STATUS PATIENT QL REPORTED: YES
GFR SERPLBLD BASED ON 1.73 SQ M-ARVRAT: 91 ML/MIN/1.73M2 (ref 60–?)
GLOBULIN PLAS-MCNC: 4 G/DL (ref 2.8–4.4)
GLUCOSE BLD-MCNC: 92 MG/DL (ref 70–99)
HBA1C MFR BLD: 6.1 % (ref ?–5.7)
HCT VFR BLD AUTO: 42.2 %
HDLC SERPL-MCNC: 73 MG/DL (ref 40–59)
HGB BLD-MCNC: 12.7 G/DL
IMM GRANULOCYTES # BLD AUTO: 0.01 X10(3) UL (ref 0–1)
IMM GRANULOCYTES NFR BLD: 0.2 %
LDLC SERPL CALC-MCNC: 100 MG/DL (ref ?–100)
LYMPHOCYTES # BLD AUTO: 1.71 X10(3) UL (ref 1–4)
LYMPHOCYTES NFR BLD AUTO: 28.9 %
MCH RBC QN AUTO: 27.8 PG (ref 26–34)
MCHC RBC AUTO-ENTMCNC: 30.1 G/DL (ref 31–37)
MCV RBC AUTO: 92.3 FL
MONOCYTES # BLD AUTO: 0.63 X10(3) UL (ref 0.1–1)
MONOCYTES NFR BLD AUTO: 10.7 %
NEUTROPHILS # BLD AUTO: 3.39 X10 (3) UL (ref 1.5–7.7)
NEUTROPHILS # BLD AUTO: 3.39 X10(3) UL (ref 1.5–7.7)
NEUTROPHILS NFR BLD AUTO: 57.3 %
NONHDLC SERPL-MCNC: 115 MG/DL (ref ?–130)
OSMOLALITY SERPL CALC.SUM OF ELEC: 291 MOSM/KG (ref 275–295)
PLATELET # BLD AUTO: 243 10(3)UL (ref 150–450)
POTASSIUM SERPL-SCNC: 4 MMOL/L (ref 3.5–5.1)
PROT SERPL-MCNC: 6.9 G/DL (ref 6.4–8.2)
RBC # BLD AUTO: 4.57 X10(6)UL
SODIUM SERPL-SCNC: 140 MMOL/L (ref 136–145)
TRIGL SERPL-MCNC: 80 MG/DL (ref 30–149)
TSI SER-ACNC: 3.38 MIU/ML (ref 0.36–3.74)
VLDLC SERPL CALC-MCNC: 13 MG/DL (ref 0–30)
WBC # BLD AUTO: 5.9 X10(3) UL (ref 4–11)

## 2023-06-17 PROCEDURE — 80050 GENERAL HEALTH PANEL: CPT | Performed by: FAMILY MEDICINE

## 2023-06-17 PROCEDURE — 80061 LIPID PANEL: CPT | Performed by: FAMILY MEDICINE

## 2023-06-17 PROCEDURE — 83036 HEMOGLOBIN GLYCOSYLATED A1C: CPT | Performed by: FAMILY MEDICINE

## 2023-07-06 ENCOUNTER — OFFICE VISIT (OUTPATIENT)
Dept: FAMILY MEDICINE CLINIC | Facility: CLINIC | Age: 58
End: 2023-07-06
Payer: COMMERCIAL

## 2023-07-06 VITALS
OXYGEN SATURATION: 97 % | HEART RATE: 94 BPM | WEIGHT: 214.38 LBS | SYSTOLIC BLOOD PRESSURE: 130 MMHG | DIASTOLIC BLOOD PRESSURE: 84 MMHG | BODY MASS INDEX: 35.72 KG/M2 | HEIGHT: 65 IN | TEMPERATURE: 97 F | RESPIRATION RATE: 18 BRPM

## 2023-07-06 DIAGNOSIS — F90.0 ATTENTION DEFICIT HYPERACTIVITY DISORDER (ADHD), PREDOMINANTLY INATTENTIVE TYPE: ICD-10-CM

## 2023-07-06 DIAGNOSIS — M47.816 OSTEOARTHRITIS OF LUMBAR SPINE, UNSPECIFIED SPINAL OSTEOARTHRITIS COMPLICATION STATUS: ICD-10-CM

## 2023-07-06 DIAGNOSIS — M79.644 CHRONIC THUMB PAIN, BILATERAL: ICD-10-CM

## 2023-07-06 DIAGNOSIS — M19.041 ARTHRITIS OF BOTH HANDS: ICD-10-CM

## 2023-07-06 DIAGNOSIS — Z51.81 THERAPEUTIC DRUG MONITORING: ICD-10-CM

## 2023-07-06 DIAGNOSIS — F90.2 ATTENTION DEFICIT HYPERACTIVITY DISORDER (ADHD), COMBINED TYPE: ICD-10-CM

## 2023-07-06 DIAGNOSIS — M79.645 CHRONIC THUMB PAIN, BILATERAL: ICD-10-CM

## 2023-07-06 DIAGNOSIS — R73.03 PREDIABETES: ICD-10-CM

## 2023-07-06 DIAGNOSIS — R74.8 ELEVATED LIVER ENZYMES: ICD-10-CM

## 2023-07-06 DIAGNOSIS — Z83.71 FAMILY HISTORY OF COLONIC POLYPS: ICD-10-CM

## 2023-07-06 DIAGNOSIS — M19.042 ARTHRITIS OF BOTH HANDS: ICD-10-CM

## 2023-07-06 DIAGNOSIS — F33.42 RECURRENT MAJOR DEPRESSIVE DISORDER, IN FULL REMISSION (HCC): ICD-10-CM

## 2023-07-06 DIAGNOSIS — G89.29 CHRONIC THUMB PAIN, BILATERAL: ICD-10-CM

## 2023-07-06 DIAGNOSIS — Z00.00 WELL ADULT EXAM: Primary | ICD-10-CM

## 2023-07-06 PROCEDURE — 3075F SYST BP GE 130 - 139MM HG: CPT | Performed by: FAMILY MEDICINE

## 2023-07-06 PROCEDURE — 99396 PREV VISIT EST AGE 40-64: CPT | Performed by: FAMILY MEDICINE

## 2023-07-06 PROCEDURE — 3008F BODY MASS INDEX DOCD: CPT | Performed by: FAMILY MEDICINE

## 2023-07-06 PROCEDURE — 99213 OFFICE O/P EST LOW 20 MIN: CPT | Performed by: FAMILY MEDICINE

## 2023-07-06 PROCEDURE — 3079F DIAST BP 80-89 MM HG: CPT | Performed by: FAMILY MEDICINE

## 2023-08-08 ENCOUNTER — HOSPITAL ENCOUNTER (OUTPATIENT)
Dept: ULTRASOUND IMAGING | Age: 58
Discharge: HOME | End: 2023-08-08
Attending: FAMILY MEDICINE
Payer: COMMERCIAL

## 2023-08-08 DIAGNOSIS — R74.8 ELEVATED LIVER ENZYMES: ICD-10-CM

## 2023-08-08 PROCEDURE — 76700 US EXAM ABDOM COMPLETE: CPT | Performed by: FAMILY MEDICINE

## 2023-08-09 DIAGNOSIS — F33.41 RECURRENT MAJOR DEPRESSIVE DISORDER IN PARTIAL REMISSION (HCC): ICD-10-CM

## 2023-08-09 DIAGNOSIS — Z51.81 THERAPEUTIC DRUG MONITORING: ICD-10-CM

## 2023-08-09 RX ORDER — BUPROPION HYDROCHLORIDE 150 MG/1
TABLET ORAL
Qty: 270 TABLET | Refills: 0 | Status: SHIPPED | OUTPATIENT
Start: 2023-08-09

## 2023-08-13 PROBLEM — K80.20 CALCULUS OF GALLBLADDER WITHOUT CHOLECYSTITIS WITHOUT OBSTRUCTION: Status: ACTIVE | Noted: 2023-08-13

## 2023-08-13 PROBLEM — R74.01 TRANSAMINITIS: Status: ACTIVE | Noted: 2023-08-13

## 2023-09-07 NOTE — TELEPHONE ENCOUNTER
Patient: Damon Jordan  : 1957    Encounter Date: 2023    Name: Damon Jordan  : 1957  MRN: [id number]  Visit Date: 2023  Chief Complaint: Monthly visit for management of chronic disease    HPI: 65 year old [facility] man with PMH remarkable for disorganized schizophrenia, HTN, GERD, COPD , anemia protein calorie malnutrition, and cognitive impairment, who has had multiple admissions to [facility]. His most recent admission was on 2019 for schizophrenia. He did have COVID 19 in 2021, but has has no hospitalizations in past 12 months  -- pt was found on floor on his hands and knees on 23, no injuries noted or reported    Subjective: Seen and examined today. He is sitting up in chair on exam in no acute distress. He does not provide subjective data, remains nonverbal on exam.    ROS:  Unable to obtain ROS, patient nonverbal on exam    Medications:  Medications reviewed and verified in NH chart.     Allergies:  No Known Allergies     Vital Signs: Reviewed in [facility]    Physical Exam:  CONSTITUTIONAL: Thin gentleman sitting up on in chair on exam in no acute distress. He does not verbalize on exam, but does allow me to examine him  SKIN: Warm & Dry, no lesions, no rashes.  EYES: PERRL, EOMI, left eye redness noted  ENMT: Mucous membranes moist, no apparent injury, no lesions seen   HEAD/NECK: No lymphadenopathy, thyromegaly or JVD.  HEART: Regular rate & rhythm, no murmurs, 2+ equal pulses of the extremities, Normal S1 & S2.  LUNGS: CTAB, with good chest expansion and good air movement, thorax symmetric  ABDOMEN: Nondistended, soft, non-tender, +BS, no rebound tenderness or guarding.   EXTREMITIES: Normal extremities, no cyanosis, no edema, no contusions, no clubbing  NEUROLOGIC: Alert, nonverbal on exam. intact senses, motor, response and reflexes ok, normal  .   Results/Data:   CMP, CBC/diff, lamotrigine level reviewed from 23    Provider Impression:   Left eye  Requested Prescriptions     Pending Prescriptions Disp Refills   • TIZANIDINE HCL 4 MG Oral Cap [Pharmacy Med Name: TIZANIDINE HCL 4 MG CAPSULE] 30 capsule 0     Sig: TAKE 1 CAPSULE (4 MG TOTAL) BY MOUTH NIGHTLY AS NEEDED FOR MUSCLE SPASMS.      Last fill 3 conjunctivitis  - he was initially started on Tobramycin after I saw him in July  - he continued to have redness, edema and drainage from left eye, was started on 7 day course of Valacyclovir per CNP on 7/17/23 for possible shingles left eye  - he had apt with eye doctor on 07/21/23, tobramycin stopped, started on Tobradex, contact isolation d/c'd per eye doctor rec  - continued to have redness, edema, was started on Prednisone, has upcoming follow up with eye doctor this month  - continue to monitor    Schizophrenia:   -psych continues to follow and manage psych medications  -c/w current psych meds per their recommendations  -he is cooperative with exam today, continues to have no verbalization on exams which is not new for him  - he does have episodes where he paces the halls for several hours at a time, had a fall in December without injury during one of these episodes, where staff witnessed him trip while ambulating and fall onto his hands and knees and get right back up without allowing staff to assess him, no apparent injuries noted at that time, and another fall on 02/18/23 where he was found on his hands and knees, no apparent injuries at that time   - no falls noted or reported since last exam  - no new or worsening behaviors noted or reported since previous exam  - PRN Vistaril in place for increased anxiety    Vitamin D deficiency  - c/w supplementation  - monitor vitamin d level, WNL on 04/25/23    Anemia:   - continue monitoring Hgb/Hct which has been stable  - most recent CBC from 8/22/23 stable    Moderate Protein Calorie Malnutrition:   - dietician following  - Continue with nutritional supplements  - continue to monitor weight which continues to be stable    Constipation   -c/w stool softeners  -c/w prn laxatives  -continue to monitor bm's    HTN:  -not currently receiving any antihypertensive medications   - continue to monitor BP which continues to be stable    COPD:  -stable  -no signs or  symptoms of respiratory distress or exacerbation on exam  - he recovered nicely from COVID(November 2021)  ----------------  Written by Leeann Kaur LPN, acting as a scribe for Dr. Mcgee. This note accurately reflects the work and decisions made by Dr. Mcgee.     I, Dr. Mcgee, attest all medical record entries made by the scribe were under my direction and were personally dictated by me. I have reviewed the chart and agree that the record accurately reflects my performance of the history, physical exam, and assessment and plan.     Electronically Signed By: Yung Levine MD   10/20/23  9:38 AM

## 2023-10-17 ENCOUNTER — HOSPITAL ENCOUNTER (OUTPATIENT)
Dept: GENERAL RADIOLOGY | Age: 58
Discharge: HOME | End: 2023-10-17
Attending: FAMILY MEDICINE
Payer: COMMERCIAL

## 2023-10-17 ENCOUNTER — OFFICE VISIT (OUTPATIENT)
Dept: FAMILY MEDICINE CLINIC | Facility: CLINIC | Age: 58
End: 2023-10-17
Payer: COMMERCIAL

## 2023-10-17 VITALS
DIASTOLIC BLOOD PRESSURE: 84 MMHG | HEIGHT: 65 IN | OXYGEN SATURATION: 99 % | TEMPERATURE: 97 F | SYSTOLIC BLOOD PRESSURE: 118 MMHG | HEART RATE: 81 BPM | BODY MASS INDEX: 36.18 KG/M2 | WEIGHT: 217.13 LBS

## 2023-10-17 DIAGNOSIS — M19.032 ARTHRITIS OF LEFT WRIST: ICD-10-CM

## 2023-10-17 DIAGNOSIS — M25.532 ACUTE PAIN OF LEFT WRIST: Primary | ICD-10-CM

## 2023-10-17 DIAGNOSIS — M25.532 ACUTE PAIN OF LEFT WRIST: ICD-10-CM

## 2023-10-17 PROCEDURE — 99214 OFFICE O/P EST MOD 30 MIN: CPT | Performed by: FAMILY MEDICINE

## 2023-10-17 PROCEDURE — 3008F BODY MASS INDEX DOCD: CPT | Performed by: FAMILY MEDICINE

## 2023-10-17 PROCEDURE — 73110 X-RAY EXAM OF WRIST: CPT | Performed by: FAMILY MEDICINE

## 2023-10-17 PROCEDURE — 3079F DIAST BP 80-89 MM HG: CPT | Performed by: FAMILY MEDICINE

## 2023-10-17 PROCEDURE — 3074F SYST BP LT 130 MM HG: CPT | Performed by: FAMILY MEDICINE

## 2023-10-17 RX ORDER — MELOXICAM 15 MG/1
15 TABLET ORAL DAILY
Qty: 10 TABLET | Refills: 0 | Status: SHIPPED | OUTPATIENT
Start: 2023-10-17 | End: 2023-10-27

## 2023-10-17 RX ORDER — METHYLPREDNISOLONE 4 MG/1
TABLET ORAL
Qty: 21 EACH | Refills: 0 | Status: SHIPPED | OUTPATIENT
Start: 2023-10-17

## 2023-10-26 ENCOUNTER — TELEPHONE (OUTPATIENT)
Dept: ORTHOPEDICS CLINIC | Facility: CLINIC | Age: 58
End: 2023-10-26

## 2023-10-26 DIAGNOSIS — M25.552 BILATERAL HIP PAIN: Primary | ICD-10-CM

## 2023-10-26 DIAGNOSIS — M25.551 BILATERAL HIP PAIN: Primary | ICD-10-CM

## 2023-10-26 NOTE — TELEPHONE ENCOUNTER
Future Appointments   Date Time Provider Andree Grant   12/6/2023  3:20 PM Renny Santamaria MD [facility]   1/4/2024  8:15 AM Manuel Chappell MD [facility]     Xray ordered, please schedule with patient.   Thank you

## 2023-10-26 NOTE — TELEPHONE ENCOUNTER
Future Appointments   Date Time Provider Andree Grant   12/6/2023  3:20 PM Kirk Bowles MD [facility]   1/4/2024  8:15 AM Christiane Warner MD [facility]     No x rays needed. Thank you!

## 2023-11-29 DIAGNOSIS — F33.41 RECURRENT MAJOR DEPRESSIVE DISORDER IN PARTIAL REMISSION (HCC): ICD-10-CM

## 2023-11-29 DIAGNOSIS — Z51.81 THERAPEUTIC DRUG MONITORING: ICD-10-CM

## 2023-12-01 NOTE — TELEPHONE ENCOUNTER
Requesting Bupropion 150mg  LOV: 10/17/23 acute  RTC: prn  Last Relevant Labs: 6/17/23  Filled: 8/9/23 #270 with 0 refills    Future Appointments   Date Time Provider Andree Sandersi   12/6/2023  2:50 PM NAP XR RM2 [facility] XRAY [facility] [place]   12/6/2023  3:20 PM Dinora Goddard MD [facility]   1/4/2024  8:15 AM Renea Greene MD [facility]   1/9/2024 12:00 PM Odilon Sparks DO [facility] [address]     Non-protocol med:  Rx pended and routed for approval/denial

## 2023-12-03 RX ORDER — BUPROPION HYDROCHLORIDE 150 MG/1
TABLET ORAL
Qty: 270 TABLET | Refills: 0 | Status: SHIPPED | OUTPATIENT
Start: 2023-12-03

## 2023-12-06 ENCOUNTER — OFFICE VISIT (OUTPATIENT)
Dept: ORTHOPEDICS CLINIC | Facility: CLINIC | Age: 58
End: 2023-12-06
Payer: COMMERCIAL

## 2023-12-06 ENCOUNTER — HOSPITAL ENCOUNTER (OUTPATIENT)
Dept: GENERAL RADIOLOGY | Age: 58
Discharge: HOME | End: 2023-12-06
Attending: ORTHOPAEDIC SURGERY
Payer: COMMERCIAL

## 2023-12-06 VITALS — BODY MASS INDEX: 37.7 KG/M2 | HEIGHT: 64 IN | WEIGHT: 220.81 LBS

## 2023-12-06 DIAGNOSIS — M25.552 BILATERAL HIP PAIN: Primary | ICD-10-CM

## 2023-12-06 DIAGNOSIS — M76.899 TENDINITIS INVOLVING HIP ABDUCTORS, UNSPECIFIED LATERALITY: ICD-10-CM

## 2023-12-06 DIAGNOSIS — M76.30 TENDINITIS OF ILIOTIBIAL BAND, UNSPECIFIED LATERALITY: ICD-10-CM

## 2023-12-06 DIAGNOSIS — M25.551 BILATERAL HIP PAIN: Primary | ICD-10-CM

## 2023-12-06 DIAGNOSIS — M25.552 BILATERAL HIP PAIN: ICD-10-CM

## 2023-12-06 DIAGNOSIS — M25.551 BILATERAL HIP PAIN: ICD-10-CM

## 2023-12-06 PROCEDURE — 73523 X-RAY EXAM HIPS BI 5/> VIEWS: CPT | Performed by: ORTHOPAEDIC SURGERY

## 2023-12-06 PROCEDURE — 3008F BODY MASS INDEX DOCD: CPT | Performed by: ORTHOPAEDIC SURGERY

## 2023-12-06 PROCEDURE — 99203 OFFICE O/P NEW LOW 30 MIN: CPT | Performed by: ORTHOPAEDIC SURGERY

## 2023-12-06 RX ORDER — METHYLPREDNISOLONE 4 MG/1
TABLET ORAL
Qty: 21 TABLET | Refills: 0 | Status: SHIPPED | OUTPATIENT
Start: 2023-12-06

## 2023-12-06 RX ORDER — MELOXICAM 15 MG/1
15 TABLET ORAL DAILY
Qty: 30 TABLET | Refills: 0 | Status: SHIPPED | OUTPATIENT
Start: 2023-12-06 | End: 2024-01-05

## 2023-12-21 DIAGNOSIS — M25.552 BILATERAL HIP PAIN: ICD-10-CM

## 2023-12-21 DIAGNOSIS — M76.30 TENDINITIS OF ILIOTIBIAL BAND, UNSPECIFIED LATERALITY: ICD-10-CM

## 2023-12-21 DIAGNOSIS — M76.899 TENDINITIS INVOLVING HIP ABDUCTORS, UNSPECIFIED LATERALITY: Primary | ICD-10-CM

## 2023-12-21 DIAGNOSIS — M25.551 BILATERAL HIP PAIN: ICD-10-CM

## 2023-12-21 NOTE — TELEPHONE ENCOUNTER
Insurance will only cover if 90 day supply     Meloxicam 15 mg   DOS: N/A  Last OV: 12/6/23  Last refill date: 12/6/23     #/refills: 30/0  Upcoming appt:    Future Appointments   Date Time Provider Andree Juanita   1/4/2024  8:15 AM Didi Dinero MD [facility]   1/15/2024  2:45 PM Brittaney Alonzo DO [facility] [address]     6/17/23  BUN  7 - 18 mg/dL 17   Creatinine  0.55 - 1.02 mg/dL 0.76    9.0    291   eGFR-Cr  >=60 mL/min/1.73m2 91

## 2023-12-22 RX ORDER — MELOXICAM 15 MG/1
15 TABLET ORAL DAILY
Qty: 90 TABLET | Refills: 0 | Status: SHIPPED | OUTPATIENT
Start: 2023-12-22 | End: 2024-03-21

## 2024-01-01 DIAGNOSIS — Z78.0 MENOPAUSE: ICD-10-CM

## 2024-01-01 DIAGNOSIS — R45.86 MOOD CHANGE: ICD-10-CM

## 2024-01-02 RX ORDER — ESCITALOPRAM OXALATE 10 MG/1
10 TABLET ORAL DAILY
Qty: 90 TABLET | Refills: 0 | Status: SHIPPED | OUTPATIENT
Start: 2024-01-02

## 2024-01-02 NOTE — TELEPHONE ENCOUNTER
Requesting Escitalopram 10mg  Last OV: 10/17/23 - Nii  RTC: prn  Last Rx'd 10/5/23 #90 with 0 refill    Future Appointments   Date Time Provider Department Center   1/4/2024  8:15 AM Dash Card MD [facility]   1/15/2024  2:45 PM Apryl Alonzo DO [facility] [address]       Non-protocol med:  Rx pended and routed for approval/denial

## 2024-02-08 ENCOUNTER — TELEPHONE (OUTPATIENT)
Dept: FAMILY MEDICINE CLINIC | Facility: CLINIC | Age: 59
End: 2024-02-08

## 2024-02-08 NOTE — TELEPHONE ENCOUNTER
Received incoming fax from pharmacy Lisdexamfetamine Dimesylate (VYVANSE) 70 MG, product backordered/unavailable-nationwide backorder. Will place fax in MA folder

## 2024-02-26 ENCOUNTER — OFFICE VISIT (OUTPATIENT)
Dept: ORTHOPEDICS CLINIC | Facility: CLINIC | Age: 59
End: 2024-02-26
Payer: COMMERCIAL

## 2024-02-26 ENCOUNTER — HOSPITAL ENCOUNTER (OUTPATIENT)
Dept: MAMMOGRAPHY | Age: 59
Discharge: HOME | End: 2024-02-26
Attending: FAMILY MEDICINE
Payer: COMMERCIAL

## 2024-02-26 VITALS — WEIGHT: 220 LBS | BODY MASS INDEX: 37.56 KG/M2 | HEIGHT: 64 IN

## 2024-02-26 DIAGNOSIS — M19.132: Primary | ICD-10-CM

## 2024-02-26 DIAGNOSIS — Z12.31 SCREENING MAMMOGRAM FOR BREAST CANCER: ICD-10-CM

## 2024-02-26 DIAGNOSIS — M18.10 ARTHRITIS OF CARPOMETACARPAL (CMC) JOINT OF THUMB: ICD-10-CM

## 2024-02-26 PROCEDURE — 77067 SCR MAMMO BI INCL CAD: CPT | Performed by: FAMILY MEDICINE

## 2024-02-26 PROCEDURE — 77063 BREAST TOMOSYNTHESIS BI: CPT | Performed by: FAMILY MEDICINE

## 2024-02-26 NOTE — H&P
Clinic Note     Assessment/Plan:  59 year old female    Left DRUJ joint Arthritis - Xray revealed advanced degenerative changes of the DRUJ joint. I discussed with the patient various treatment options and their success rate/risks. Discussed non-surgical treatment options such as turmeric, CBD oil, or topical ointment, and oral NSAIDS for pain management, as well as immobilization via bracing and corticosteroid injections.  Patient would like to switch from Meloxicam to Turmeric supplement instead for management of symptoms.   Left CMC joint Arthritis - Xray revealed advanced degenerative changes of the thumb CMC joint with subchondral cyst formation. I discussed with the patient various treatment options and their success rate/risks.  Patient would like to proceed with Turmeric supplementation for management of symptoms. If symptoms persist or worsen, patient may follow up for a corticosteroid injection. Patient agrees with the plan.     Follow Up: 6-8 weeks    Diagnostic Studies:     XR Left wrist 3 views: Advanced degenerative changes of the DRUJ joint.  Advanced degenerative changes of the thumb CMC joint with subchondral cyst formation.       Physical Exam:     Ht 5' 4\" (1.626 m)   Wt 220 lb (99.8 kg)   BMI 37.76 kg/m²     Constitutional: NAD. AOx3. Well-developed and Well-nourished.   Psychiatric: Normal mood/ affect/ behavior. Judgment and thought content normal.     Left Upper Extremity:     Inspection    Skin intact. No skin lesions. No obvious mass visualized.    Palpation    TTP over DRUJ joint. TTP over CMC joints.      ROM    Full composite fist.     Neurovascular    Normal sensation in the median, ulnar, and radial nerve distribution. Normal motor function of muscles innervated by median/AIN, ulnar, and radial/PIN nerves.    Normally perfused hand(s).     Special    Crepitus of the DRUJ is noted.  Patient has pain with DRUJ compression rotation.            CC: Left wrist pain    HPI: This 59 year  old RHD female presents with left wrist pain.  Sharp.  Pain is exacerbated when pinching or grasping items. Currently reports 0 pain.  No numbness or tingling.      Occupation:       History/Other:   Past Medical History:   Diagnosis Date    Anxiety 1996    Arthritis     Attention deficit hyperactivity disorder (ADHD), predominantly inattentive type 2019    BMI 34.0-34.9,adult 2015    BMI 35.0-35.9,adult 2015    Calculus of gallbladder without cholecystitis without obstruction 2023    Class 2 obesity due to excess calories with body mass index (BMI) of 36.0 to 36.9 in adult 2021    Depression 1983    Esophageal reflux 2013    Fracture, jaw (HCC)     Globus sensation 2015    Major depressive disorder, recurrent episode, moderate ([facility]) 2018    Obesity 2015    Recurrent major depressive disorder, in full remission ([facility]) 2019    On Wellbutrin  mg qam    Severe obesity (BMI 35.0-39.9) 2018    BMI 38.00 2018    Sprain of right rotator cuff capsule 2017    Transaminitis 2023    Unspecified sleep apnea 2005    Mild    Vitamin D deficiency 2018     Past Surgical History:   Procedure Laterality Date    ABLATION  2002    COLONOSCOPY  2018    Dr. Dozier    HYSTERECTOMY  2003    partial. pt still has cervix and ovaries      , ,     OTHER SURGICAL HISTORY      Broken jaw,  deviated septum,  deviated septum    ROTATOR CUFF REPAIR Right 2021    TUBAL LIGATION       Current Outpatient Medications   Medication Sig Dispense Refill    Lisdexamfetamine Dimesylate (VYVANSE) 70 MG Oral Cap Take 1 capsule (70 mg total) by mouth daily. 30 capsule 0    [START ON 3/17/2024] Lisdexamfetamine Dimesylate (VYVANSE) 70 MG Oral Cap Take 1 capsule (70 mg total) by mouth every morning. 30 capsule 0    buPROPion  MG Oral Tablet 24 Hr Take 3 tablets (450 mg total) by mouth every morning.  270 tablet 0    Meloxicam 15 MG Oral Tab Take 1 tablet (15 mg total) by mouth daily. 90 tablet 0    Lisdexamfetamine Dimesylate (VYVANSE) 70 MG Oral Cap Take 1 capsule (70 mg total) by mouth every morning. 30 capsule 0    methylPREDNISolone (MEDROL) 4 MG Oral Tablet Therapy Pack As directed. 21 tablet 0    methylPREDNISolone (MEDROL) 4 MG Oral Tablet Therapy Pack As directed. 21 each 0     Allergies   Allergen Reactions    Codeine NAUSEA ONLY     Family History   Problem Relation Age of Onset    Diabetes Father     Heart Disease Father     Cancer Father         Prostrate    Heart Disorder Father     Hypertension Father     Other (depression) Father     Other (hypothyroid) Father     Diabetes Mother     Hypertension Mother     Diabetes Paternal Grandmother     Diabetes Maternal Grandmother     Thyroid Disorder Brother         hyperthyroid    Eye Problems Brother         cataract    Cancer Brother         Lung    Hypertension Brother     Eye Problems Brother         cataract    Hypertension Brother     Obesity Brother     Depression Brother     Hypertension Son     Mental Disorder Daughter         ADD    Other (Fatty Liver) Daughter     Cancer Maternal Grandfather         colon cancer     Social History     Occupational History    Not on file   Tobacco Use    Smoking status: Never    Smokeless tobacco: Never   Vaping Use    Vaping Use: Never used   Substance and Sexual Activity    Alcohol use: Yes     Alcohol/week: 0.0 - 1.0 standard drinks of alcohol    Drug use: No    Sexual activity: Yes     Partners: Male      Assessment       Review of Systems (negative unless bolded):  General: fevers, chills, fatigue  CV:  chest pain, palpitations, leg swelling  Msk: bodyaches, neck pain, neck stiffness  Skin: rashes, open wounds, nonhealing ulcers  Hem: bleeds easily, bruise easily, immunocompromised  Neuro: dizziness, light headedness, headaches  Psych: anxious, depressed, anger issues    Attention: This note has been scribed  by Molly Mendiola under the supervision of Dash Card MD.     Dash Card MD   Hand, Wrist, & Elbow Surgery  [email or web address]  t: [phone]  f: [phone]

## 2024-03-04 ENCOUNTER — TELEPHONE (OUTPATIENT)
Dept: ORTHOPEDICS CLINIC | Facility: CLINIC | Age: 59
End: 2024-03-04

## 2024-03-04 DIAGNOSIS — M25.512 LEFT SHOULDER PAIN, UNSPECIFIED CHRONICITY: Primary | ICD-10-CM

## 2024-03-04 NOTE — TELEPHONE ENCOUNTER
XR ordered per ortho protocol. XR scheduled and patient was notified via [facility] [employer] [facility] to let them know that they should arrive 15-20 minutes early, in order for them to complete imaging.

## 2024-03-04 NOTE — TELEPHONE ENCOUNTER
Future Appointments   Date Time Provider Department Center   3/20/2024  7:40 AM [id number]       This patient is coming for LT Shoulder issue similar from the RT. No prior imaging done yet. Please advise if views are needed for this appt. Thanks.    Patient may be reached at [phone]

## 2024-03-18 DIAGNOSIS — M76.30 TENDINITIS OF ILIOTIBIAL BAND, UNSPECIFIED LATERALITY: ICD-10-CM

## 2024-03-18 DIAGNOSIS — M25.552 BILATERAL HIP PAIN: ICD-10-CM

## 2024-03-18 DIAGNOSIS — M76.899 TENDINITIS INVOLVING HIP ABDUCTORS, UNSPECIFIED LATERALITY: ICD-10-CM

## 2024-03-18 DIAGNOSIS — M25.551 BILATERAL HIP PAIN: ICD-10-CM

## 2024-03-18 RX ORDER — MELOXICAM 15 MG/1
15 TABLET ORAL DAILY
Qty: 90 TABLET | Refills: 0 | Status: SHIPPED | OUTPATIENT
Start: 2024-03-18 | End: 2024-06-16

## 2024-03-18 NOTE — TELEPHONE ENCOUNTER
Meloxicam 15 mg  DOS: N/A  Last OV: 02/26/24  Last refill date: 12/22/23     #/refills: 90/0  Upcoming appt:   Future Appointments   Date Time Provider Department Center   3/20/2024  7:25 AM WDR XR RM1 [facility] XRAY [facility]   3/20/2024  7:40 AM Yayo Holloway MD [id number]     06/17/23  BUN  7 - 18 mg/dL 17   Creatinine  0.55 - 1.02 mg/dL 0.76           eGFR-Cr  >=60 mL/min/1.73m2 91

## 2024-03-20 ENCOUNTER — OFFICE VISIT (OUTPATIENT)
Dept: ORTHOPEDICS CLINIC | Facility: CLINIC | Age: 59
End: 2024-03-20
Payer: COMMERCIAL

## 2024-03-20 ENCOUNTER — HOSPITAL ENCOUNTER (OUTPATIENT)
Dept: GENERAL RADIOLOGY | Age: 59
Discharge: HOME | End: 2024-03-20
Attending: ORTHOPAEDIC SURGERY
Payer: COMMERCIAL

## 2024-03-20 VITALS — WEIGHT: 220 LBS | BODY MASS INDEX: 36.65 KG/M2 | HEIGHT: 65 IN

## 2024-03-20 DIAGNOSIS — M75.82 TENDINITIS OF LEFT ROTATOR CUFF: Primary | ICD-10-CM

## 2024-03-20 DIAGNOSIS — M25.512 LEFT SHOULDER PAIN, UNSPECIFIED CHRONICITY: ICD-10-CM

## 2024-03-20 PROCEDURE — 3008F BODY MASS INDEX DOCD: CPT | Performed by: ORTHOPAEDIC SURGERY

## 2024-03-20 PROCEDURE — 99213 OFFICE O/P EST LOW 20 MIN: CPT | Performed by: ORTHOPAEDIC SURGERY

## 2024-03-20 PROCEDURE — 73030 X-RAY EXAM OF SHOULDER: CPT | Performed by: ORTHOPAEDIC SURGERY

## 2024-03-20 NOTE — H&P
Orthopaedic Surgery  [address] [place] [id number]  [phone]       Name: Sherrill Cowart   MRN: [id number]  Date: 3/20/2024     CC: Left shoulder pain    REFERRED BY: Apryl Alonzo DO    HPI:   Sherrill Cowart is a very pleasant 59 year old right-hand dominant female who presents today for evaluation of left shoulder pain ongoing for 6 weeks since early February 2024. This occurred randomly after waking up one morning. Pain is 0-3/10 and described as a stabbing sensation. She has limitations with overhead movement. Anti-inflammatory medication has provided limited relief.     She is well known to our team for Right shoulder rotator cuff repair, biceps tenodesis, subacromial decompression, labral/glenohumeral joint debridement on 11/30/2021. She reports this is doing very well.    She enjoys reading and sewing. Lives with her spouse. Works as a .     PMH:   Past Medical History:   Diagnosis Date    Anxiety 06/1996    Arthritis     Attention deficit hyperactivity disorder (ADHD), predominantly inattentive type 09/12/2019    BMI 34.0-34.9,adult 02/11/2015    BMI 35.0-35.9,adult 03/23/2015    Calculus of gallbladder without cholecystitis without obstruction 08/13/2023    Class 2 obesity due to excess calories with body mass index (BMI) of 36.0 to 36.9 in adult 11/29/2021    Depression 01/1983    Esophageal reflux 2013    Fracture, jaw (HCC) 1975    Globus sensation 02/11/2015    Major depressive disorder, recurrent episode, moderate (HCC) 03/26/2018    Obesity 02/11/2015    Recurrent major depressive disorder, in full remission (HCC) 09/12/2019    On Wellbutrin  mg qam    Severe obesity (BMI 35.0-39.9) 02/26/2018    BMI 38.00 2-    Sprain of right rotator cuff capsule 05/22/2017    Transaminitis 08/13/2023    Unspecified sleep apnea 2005    Mild    Vitamin D deficiency 03/26/2018       PAST SURGICAL HX:  Past Surgical History:   Procedure Laterality Date    ABLATION   2002    COLONOSCOPY  2018    Dr. Dozier    HYSTERECTOMY  2003    partial. pt still has cervix and ovaries      , ,     OTHER SURGICAL HISTORY  1975    Broken jaw,  deviated septum,  deviated septum    ROTATOR CUFF REPAIR Right 2021    TUBAL LIGATION         FAMILY HX:  Family History   Problem Relation Age of Onset    Diabetes Father     Heart Disease Father     Cancer Father         Prostrate    Heart Disorder Father     Hypertension Father     Other (depression) Father     Other (hypothyroid) Father     Diabetes Mother     Hypertension Mother     Diabetes Paternal Grandmother     Diabetes Maternal Grandmother     Thyroid Disorder Brother         hyperthyroid    Eye Problems Brother         cataract    Cancer Brother         Lung    Hypertension Brother     Eye Problems Brother         cataract    Hypertension Brother     Obesity Brother     Depression Brother     Hypertension Son     Mental Disorder Daughter         ADD    Other (Fatty Liver) Daughter     Cancer Maternal Grandfather         colon cancer       ALLERGIES:  Codeine    MEDICATIONS:   Current Outpatient Medications   Medication Sig Dispense Refill    Meloxicam 15 MG Oral Tab Take 1 tablet (15 mg total) by mouth daily. 90 tablet 0    buPROPion  MG Oral Tablet 24 Hr Take 3 tablets (450 mg total) by mouth every morning. 270 tablet 0       ROS: A comprehensive 14 point review of systems was performed and was negative aside from the aforementioned per history of present illness.    SOCIAL HX:  Social History     Tobacco Use    Smoking status: Never    Smokeless tobacco: Never   Substance Use Topics    Alcohol use: Yes     Alcohol/week: 0.0 - 1.0 standard drinks of alcohol       PE:   Vitals:    24 0837   Weight: 220 lb   Height: 5' 5\" (1.651 m)     Estimated body mass index is 36.61 kg/m² as calculated from the following:    Height as of this encounter: 5' 5\" (1.651 m).    Weight as of this  encounter: 220 lb.    Physical Exam  Constitutional:       Appearance: Normal appearance.   HENT:      Head: Normocephalic and atraumatic.   Eyes:      Extraocular Movements: Extraocular movements intact.   Neck:      Musculoskeletal: Normal range of motion and neck supple.   Cardiovascular:      Pulses: Normal pulses.   Pulmonary:      Effort: Pulmonary effort is normal. No respiratory distress.   Abdominal:      General: There is no distension.   Skin:     General: Skin is warm.      Capillary Refill: Capillary refill takes less than 2 seconds.      Findings: No bruising.   Neurological:      General: No focal deficit present.      Mental Status: Alert.   Psychiatric:         Mood and Affect: Mood normal.     Examination of the left shoulder demonstrates:   Skin is intact, warm and dry.   Cervical:  Full ROM  Spurling's  Negative    Deformity:   none  Atrophy:   none    Scapular winging: Negative    Palpation:     AC Joint   Negative  Biceps Tendon  Negative  Greater Tuberosity Negative    ROM:   Forward Flexion:  150°  Abduction:   full and symmetric  External Rotation:  full and symmetric  Internal Rotation:  thoracolumbar junction    Rotator Cuff Strength:   Supraspinatus:   4/5  Subscapularis:   5/5  Infraspinatus/Teres: 5/5    Provocative Tests:   John:   Positive  Speed's:   Negative  [place]'s:   Equivocal  Lift-off:    Negative  Apprehension:  Negative  Sulcus Sign:   Negative    Neurovascular Upper Extremity (Bilateral)  Motor:    5/5 EPL, Finger Abduction, , Pinch, Deltoid  Sensation:   intact to light touch median, ulnar, radial and axillary nerve  Circulation:   Normal, 2+ radial pulse    The contralateral upper extremity is without limitation in range of motion or strength, no positive provocative maneuvers.       Radiographic Examination/Diagnostics:    Shoulder XR personally viewed, independently interpreted and radiology report was reviewed.    XR SHOULDER, COMPLETE (MIN 2 VIEWS), LEFT  (CPT=73030)    Result Date: 3/20/2024  PROCEDURE:  XR SHOULDER, COMPLETE (MIN 2 VIEWS), LEFT (CPT=73030)  TECHNIQUE:  Multiple views were obtained.  COMPARISON:  None.  INDICATIONS:  M25.512 Left shoulder pain, unspecified chronicity  PATIENT STATED HISTORY: (As transcribed by Technologist)  Ortho consult. Patient states left shoulder pain for several weeks, no known injury.    FINDINGS:  Mild degenerative changes at the left AC joint.  No acute displaced osseous fracture or dislocation.            CONCLUSION:  No acute osseous abnormality is evident.   LOCATION:  [facility]   Dictated by (CST): Stromberg, LeRoy, MD on 3/20/2024 at 8:47 AM     Finalized by (CST): Stromberg, LeRoy, MD on 3/20/2024 at 8:48 AM         IMPRESSION: Sherrill Cowart is a 59 year old Right hand dominant female with left rotator cuff tendinitis ongoing for 6 weeks since early February 2024.    We elected to maximize conservative management with physical therapy.     PLAN:   We had a detailed discussion outlining the etiology, anatomy, pathophysiology, and natural history of patient's findings. Imaging was reviewed in detail and correlated to a 3-dimensional model of the shoulder.     We reviewed the treatment of this disease condition.  Fortunately, treatment is amenable to conservative treatment which we chose to optimize at today's visit. I recommended physical therapy to aid in strengthening, range of motion, functional improvement, and return to baseline activity.       The patient had the opportunity to ask questions and all questions were answered appropriately.      FOLLOW-UP:   Proceed with physical therapy and return for repeat evaluation in 6-10 weeks. No imaging required at next visit.       Yayo Holloway MD  Knee, Shoulder, & Elbow Surgery / Sports Medicine Specialist  Orthopaedic Surgery  [address] [place] [id number]   [email or web address]  [email or web address]  t: [phone]  o: [phone]  f:  [phone]    This note was dictated using Dragon software.  While it was briefly proofread prior to completion, some grammatical, spelling, and word choice errors due to dictation may still occur.

## 2024-03-27 ENCOUNTER — OFFICE VISIT (OUTPATIENT)
Dept: FAMILY MEDICINE CLINIC | Facility: CLINIC | Age: 59
End: 2024-03-27
Payer: COMMERCIAL

## 2024-03-27 VITALS
BODY MASS INDEX: 36.65 KG/M2 | DIASTOLIC BLOOD PRESSURE: 84 MMHG | TEMPERATURE: 98 F | WEIGHT: 220 LBS | HEART RATE: 83 BPM | RESPIRATION RATE: 18 BRPM | HEIGHT: 65 IN | OXYGEN SATURATION: 97 % | SYSTOLIC BLOOD PRESSURE: 136 MMHG

## 2024-03-27 DIAGNOSIS — J20.9 ACUTE BRONCHITIS, UNSPECIFIED ORGANISM: Primary | ICD-10-CM

## 2024-03-27 PROCEDURE — 99213 OFFICE O/P EST LOW 20 MIN: CPT

## 2024-03-27 PROCEDURE — 3075F SYST BP GE 130 - 139MM HG: CPT

## 2024-03-27 PROCEDURE — 3079F DIAST BP 80-89 MM HG: CPT

## 2024-03-27 PROCEDURE — 3008F BODY MASS INDEX DOCD: CPT

## 2024-03-27 RX ORDER — BENZONATATE 200 MG/1
200 CAPSULE ORAL 3 TIMES DAILY PRN
Qty: 21 CAPSULE | Refills: 0 | Status: SHIPPED | OUTPATIENT
Start: 2024-03-27 | End: 2024-04-03

## 2024-03-27 RX ORDER — ALBUTEROL SULFATE 90 UG/1
2 AEROSOL, METERED RESPIRATORY (INHALATION) EVERY 4 HOURS PRN
Qty: 1 EACH | Refills: 0 | Status: SHIPPED | OUTPATIENT
Start: 2024-03-27 | End: 2024-04-03

## 2024-03-27 NOTE — PROGRESS NOTES
CHIEF COMPLAINT:     Chief Complaint   Patient presents with    Cough     I am not able to control my cough.  My head is stuffy and my chest hurts when coughing. - Entered by patient  S/s for 3 days.         HPI:   Sherrill Cowart is a 59 year old female who presents for upper respiratory symptoms for  3 days. Patient reports congestion, dry cough, denies fever. Symptoms have been worsening since onset.  Treating symptoms with honey. Denies any known exposures or other members in house sick with similar symptoms. Denies any shortness of breath, difficulty in breathing or chest pain.       Current Outpatient Medications   Medication Sig Dispense Refill    Meloxicam 15 MG Oral Tab Take 1 tablet (15 mg total) by mouth daily. 90 tablet 0    buPROPion  MG Oral Tablet 24 Hr Take 3 tablets (450 mg total) by mouth every morning. 270 tablet 0      Past Medical History:   Diagnosis Date    Anxiety 06/1996    Arthritis     Attention deficit hyperactivity disorder (ADHD), predominantly inattentive type 09/12/2019    BMI 34.0-34.9,adult 02/11/2015    BMI 35.0-35.9,adult 03/23/2015    Calculus of gallbladder without cholecystitis without obstruction 08/13/2023    Class 2 obesity due to excess calories with body mass index (BMI) of 36.0 to 36.9 in adult 11/29/2021    Depression 01/1983    Esophageal reflux 2013    Fracture, jaw ([facility]) 1975    Globus sensation 02/11/2015    Major depressive disorder, recurrent episode, moderate ([facility]) 03/26/2018    Obesity 02/11/2015    Recurrent major depressive disorder, in full remission ([facility]) 09/12/2019    On Wellbutrin  mg qam    Severe obesity (BMI 35.0-39.9) 02/26/2018    BMI 38.00 2-    Sprain of right rotator cuff capsule 05/22/2017    Transaminitis 08/13/2023    Unspecified sleep apnea 2005    Mild    Vitamin D deficiency 03/26/2018      Past Surgical History:   Procedure Laterality Date    ABLATION  08/2002    COLONOSCOPY  04/03/2018    Dr. Dozier    HYSTERECTOMY  02/2003     partial. pt still has cervix and ovaries      , ,     OTHER SURGICAL HISTORY  1975    Broken jaw,  deviated septum,  deviated septum    ROTATOR CUFF REPAIR Right 2021    TUBAL LIGATION           Social History     Socioeconomic History    Marital status:    Tobacco Use    Smoking status: Never    Smokeless tobacco: Never   Vaping Use    Vaping Use: Never used   Substance and Sexual Activity    Alcohol use: Yes     Alcohol/week: 0.0 - 1.0 standard drinks of alcohol    Drug use: No    Sexual activity: Yes     Partners: Male   Other Topics Concern    Caffeine Concern Yes    Stress Concern Yes    Weight Concern Yes    Special Diet No    Exercise No    Seat Belt Yes         REVIEW OF SYSTEMS:   GENERAL: no change in appetite  SKIN: no rashes or abnormal skin lesions  HEENT: See HPI  LUNGS: See HPI  CARDIOVASCULAR: denies chest pain or palpitations   GI: denies N/V/C or abdominal pain      EXAM:   /84   Pulse 83   Temp 98.3 °F (36.8 °C) (Oral)   Resp 18   Ht 5' 5\" (1.651 m)   Wt 220 lb (99.8 kg)   SpO2 97%   BMI 36.61 kg/m²   GENERAL: well developed, well nourished,in no apparent distress  SKIN: no rashes,no suspicious lesions  HEAD: atraumatic, normocephalic.  no tenderness on palpation of frontal and maxillary sinuses  EYES: conjunctiva clear, EOM intact  EARS: TM's pearly and intact, no bulging, no retraction,no fluid, bony landmarks visualized  NOSE: Nostrils patent, no nasal discharge, nasal mucosa non-inflamed   THROAT: Oral mucosa pink, moist. Posterior pharynx is non erythematous. no exudates. Tonsils 1/4.    NECK: Supple, non-tender  LUNGS: clear to auscultation bilaterally, no wheezes or rhonchi. Breathing is non labored. Dry cough during exam with frequent throat clearing  CARDIO: RRR without murmur  EXTREMITIES: no cyanosis, clubbing or edema  LYMPH:  no lymphadenopathy.        ASSESSMENT AND PLAN:   Sherrill Cowart is a 59 year old female who presents  with upper respiratory symptoms that are consistent with    ASSESSMENT:   Encounter Diagnosis   Name Primary?    Acute bronchitis, unspecified organism Yes       PLAN: Meds as below.  Comfort care as described in Patient Instructions. Declined viral testing. Discussion about supportive treatment including over the counter medications, hydration and rest. Recommended to use Mucinex in am, sudafed or benadryl at night, humidifier, and to start flonase.    Meds & Refills for this Visit:  Requested Prescriptions     Signed Prescriptions Disp Refills    albuterol 108 (90 Base) MCG/ACT Inhalation Aero Soln 1 each 0     Sig: Inhale 2 puffs into the lungs every 4 (four) hours as needed for Wheezing or Shortness of Breath (cough).    benzonatate 200 MG Oral Cap 21 capsule 0     Sig: Take 1 capsule (200 mg total) by mouth 3 (three) times daily as needed for cough.     Risks, benefits, and side effects of medication explained and discussed.    The patient indicates understanding of these issues and agrees to the plan.  The patient is asked to f/u with PCP if sx's persist or worsen.

## 2024-06-12 ENCOUNTER — TELEPHONE (OUTPATIENT)
Dept: FAMILY MEDICINE CLINIC | Facility: CLINIC | Age: 59
End: 2024-06-12

## 2024-06-18 DIAGNOSIS — M76.899 TENDINITIS INVOLVING HIP ABDUCTORS, UNSPECIFIED LATERALITY: ICD-10-CM

## 2024-06-18 DIAGNOSIS — M76.30 TENDINITIS OF ILIOTIBIAL BAND, UNSPECIFIED LATERALITY: ICD-10-CM

## 2024-06-18 DIAGNOSIS — M25.552 BILATERAL HIP PAIN: ICD-10-CM

## 2024-06-18 DIAGNOSIS — M25.551 BILATERAL HIP PAIN: ICD-10-CM

## 2024-06-18 RX ORDER — MELOXICAM 15 MG/1
15 TABLET ORAL DAILY
Qty: 90 TABLET | Refills: 0 | Status: SHIPPED | OUTPATIENT
Start: 2024-06-18 | End: 2024-09-16

## 2024-06-18 NOTE — TELEPHONE ENCOUNTER
Meloxicam    DOS: n/a  Last OV: 12/6/23  Last refill date: 3/18/24 #/refills: 90/0  Upcoming appt: No future appointments.      Component      Latest Ref Rng 6/17/2023   Glucose      70 - 99 mg/dL 92    Sodium      136 - 145 mmol/L 140    Potassium      3.5 - 5.1 mmol/L 4.0    Chloride      98 - 112 mmol/L 110    Carbon Dioxide, Total      21.0 - 32.0 mmol/L 25.0    ANION GAP      0 - 18 mmol/L 5    BUN      7 - 18 mg/dL 17    CREATININE      0.55 - 1.02 mg/dL 0.76    CALCIUM      8.5 - 10.1 mg/dL 9.0    CALCULATED OSMOLALITY      275 - 295 mOsm/kg 291    EGFR      >=60 mL/min/1.73m2 91    AST (SGOT)      15 - 37 U/L 64 (H)    ALT (SGPT)      13 - 56 U/L 128 (H)    ALKALINE PHOSPHATASE      46 - 118 U/L 144 (H)    Total Bilirubin      0.1 - 2.0 mg/dL 0.3    PROTEIN, TOTAL      6.4 - 8.2 g/dL 6.9    Albumin      3.4 - 5.0 g/dL 2.9 (L)    Globulin      2.8 - 4.4 g/dL 4.0    A/G Ratio      1.0 - 2.0  0.7 (L)    Patient Fasting for CMP? Yes       Legend:  (H) High  (L) Low

## 2024-10-03 DIAGNOSIS — Z51.81 THERAPEUTIC DRUG MONITORING: ICD-10-CM

## 2024-10-03 DIAGNOSIS — Z00.00 BLOOD TESTS FOR ROUTINE GENERAL PHYSICAL EXAMINATION: ICD-10-CM

## 2024-10-03 DIAGNOSIS — R73.03 PREDIABETES: ICD-10-CM

## 2024-10-03 DIAGNOSIS — E78.00 HYPERCHOLESTEROLEMIA: Primary | ICD-10-CM

## 2024-10-03 DIAGNOSIS — F33.41 RECURRENT MAJOR DEPRESSIVE DISORDER IN PARTIAL REMISSION (HCC): ICD-10-CM

## 2024-10-07 ENCOUNTER — PATIENT MESSAGE (OUTPATIENT)
Dept: FAMILY MEDICINE CLINIC | Facility: CLINIC | Age: 59
End: 2024-10-07

## 2024-10-07 RX ORDER — BUPROPION HYDROCHLORIDE 150 MG/1
450 TABLET ORAL EVERY MORNING
Qty: 30 TABLET | Refills: 0 | Status: SHIPPED | OUTPATIENT
Start: 2024-10-07 | End: 2024-10-07

## 2024-10-07 RX ORDER — BUPROPION HYDROCHLORIDE 150 MG/1
450 TABLET ORAL EVERY MORNING
Qty: 90 TABLET | Refills: 0 | Status: SHIPPED | OUTPATIENT
Start: 2024-10-07

## 2024-11-05 NOTE — TELEPHONE ENCOUNTER
Requested Prescriptions     Refused Prescriptions Disp Refills   • TIZANIDINE HCL 6 MG Oral Cap [Pharmacy Med Name: TIZANIDINE HCL 6 MG CAPSULE] 40 capsule 0     Sig: TAKE 1 CAPSULE (6 MG TOTAL) BY MOUTH EVERY 8 (EIGHT) HOURS AS NEEDED FOR MUSCLE SPASMS (B
None

## 2024-11-09 ENCOUNTER — LAB ENCOUNTER (OUTPATIENT)
Dept: LAB | Age: 59
End: 2024-11-09
Attending: FAMILY MEDICINE
Payer: COMMERCIAL

## 2024-11-09 DIAGNOSIS — Z00.00 BLOOD TESTS FOR ROUTINE GENERAL PHYSICAL EXAMINATION: ICD-10-CM

## 2024-11-09 DIAGNOSIS — R73.03 PREDIABETES: ICD-10-CM

## 2024-11-09 DIAGNOSIS — E78.00 HYPERCHOLESTEROLEMIA: ICD-10-CM

## 2024-11-09 LAB
ALBUMIN SERPL-MCNC: 4 G/DL (ref 3.2–4.8)
ALBUMIN/GLOB SERPL: 1.1 {RATIO} (ref 1–2)
ALP LIVER SERPL-CCNC: 82 U/L
ALT SERPL-CCNC: 18 U/L
ANION GAP SERPL CALC-SCNC: 2 MMOL/L (ref 0–18)
AST SERPL-CCNC: 18 U/L (ref ?–34)
BASOPHILS # BLD AUTO: 0.05 X10(3) UL (ref 0–0.2)
BASOPHILS NFR BLD AUTO: 0.8 %
BILIRUB SERPL-MCNC: 0.4 MG/DL (ref 0.3–1.2)
BUN BLD-MCNC: 10 MG/DL (ref 9–23)
CALCIUM BLD-MCNC: 10.2 MG/DL (ref 8.7–10.4)
CHLORIDE SERPL-SCNC: 108 MMOL/L (ref 98–112)
CHOLEST SERPL-MCNC: 174 MG/DL (ref ?–200)
CO2 SERPL-SCNC: 29 MMOL/L (ref 21–32)
CREAT BLD-MCNC: 0.83 MG/DL
EGFRCR SERPLBLD CKD-EPI 2021: 82 ML/MIN/1.73M2 (ref 60–?)
EOSINOPHIL # BLD AUTO: 0.08 X10(3) UL (ref 0–0.7)
EOSINOPHIL NFR BLD AUTO: 1.3 %
ERYTHROCYTE [DISTWIDTH] IN BLOOD BY AUTOMATED COUNT: 13 %
EST. AVERAGE GLUCOSE BLD GHB EST-MCNC: 114 MG/DL (ref 68–126)
FASTING PATIENT LIPID ANSWER: YES
FASTING STATUS PATIENT QL REPORTED: YES
GLOBULIN PLAS-MCNC: 3.6 G/DL (ref 2–3.5)
GLUCOSE BLD-MCNC: 94 MG/DL (ref 70–99)
HBA1C MFR BLD: 5.6 % (ref ?–5.7)
HCT VFR BLD AUTO: 44.3 %
HDLC SERPL-MCNC: 62 MG/DL (ref 40–59)
HGB BLD-MCNC: 14 G/DL
IMM GRANULOCYTES # BLD AUTO: 0.01 X10(3) UL (ref 0–1)
IMM GRANULOCYTES NFR BLD: 0.2 %
LDLC SERPL CALC-MCNC: 98 MG/DL (ref ?–100)
LYMPHOCYTES # BLD AUTO: 1.8 X10(3) UL (ref 1–4)
LYMPHOCYTES NFR BLD AUTO: 29.8 %
MCH RBC QN AUTO: 30.6 PG (ref 26–34)
MCHC RBC AUTO-ENTMCNC: 31.6 G/DL (ref 31–37)
MCV RBC AUTO: 96.7 FL
MONOCYTES # BLD AUTO: 0.55 X10(3) UL (ref 0.1–1)
MONOCYTES NFR BLD AUTO: 9.1 %
NEUTROPHILS # BLD AUTO: 3.56 X10 (3) UL (ref 1.5–7.7)
NEUTROPHILS # BLD AUTO: 3.56 X10(3) UL (ref 1.5–7.7)
NEUTROPHILS NFR BLD AUTO: 58.8 %
NONHDLC SERPL-MCNC: 112 MG/DL (ref ?–130)
OSMOLALITY SERPL CALC.SUM OF ELEC: 287 MOSM/KG (ref 275–295)
PLATELET # BLD AUTO: 265 10(3)UL (ref 150–450)
POTASSIUM SERPL-SCNC: 4.6 MMOL/L (ref 3.5–5.1)
PROT SERPL-MCNC: 7.6 G/DL (ref 5.7–8.2)
RBC # BLD AUTO: 4.58 X10(6)UL
SODIUM SERPL-SCNC: 139 MMOL/L (ref 136–145)
TRIGL SERPL-MCNC: 74 MG/DL (ref 30–149)
VLDLC SERPL CALC-MCNC: 12 MG/DL (ref 0–30)
WBC # BLD AUTO: 6.1 X10(3) UL (ref 4–11)

## 2024-11-09 PROCEDURE — 80061 LIPID PANEL: CPT

## 2024-11-09 PROCEDURE — 80053 COMPREHEN METABOLIC PANEL: CPT

## 2024-11-09 PROCEDURE — 83036 HEMOGLOBIN GLYCOSYLATED A1C: CPT

## 2024-11-09 PROCEDURE — 85025 COMPLETE CBC W/AUTO DIFF WBC: CPT

## 2024-11-19 ENCOUNTER — OFFICE VISIT (OUTPATIENT)
Dept: FAMILY MEDICINE CLINIC | Facility: CLINIC | Age: 59
End: 2024-11-19
Payer: COMMERCIAL

## 2024-11-19 VITALS
BODY MASS INDEX: 36.99 KG/M2 | TEMPERATURE: 97 F | WEIGHT: 222 LBS | HEIGHT: 65 IN | OXYGEN SATURATION: 96 % | RESPIRATION RATE: 16 BRPM | SYSTOLIC BLOOD PRESSURE: 120 MMHG | DIASTOLIC BLOOD PRESSURE: 80 MMHG | HEART RATE: 92 BPM

## 2024-11-19 DIAGNOSIS — F90.0 ATTENTION DEFICIT HYPERACTIVITY DISORDER (ADHD), PREDOMINANTLY INATTENTIVE TYPE: ICD-10-CM

## 2024-11-19 DIAGNOSIS — F33.42 RECURRENT MAJOR DEPRESSIVE DISORDER, IN FULL REMISSION (HCC): ICD-10-CM

## 2024-11-19 DIAGNOSIS — Z12.83 SKIN CANCER SCREENING: ICD-10-CM

## 2024-11-19 DIAGNOSIS — Z83.719 FAMILY HISTORY OF COLONIC POLYPS: ICD-10-CM

## 2024-11-19 DIAGNOSIS — Z12.31 BREAST CANCER SCREENING BY MAMMOGRAM: ICD-10-CM

## 2024-11-19 DIAGNOSIS — Z51.81 THERAPEUTIC DRUG MONITORING: ICD-10-CM

## 2024-11-19 DIAGNOSIS — Z00.00 WELL ADULT EXAM: Primary | ICD-10-CM

## 2024-11-19 DIAGNOSIS — E78.00 HYPERCHOLESTEROLEMIA: ICD-10-CM

## 2024-11-19 DIAGNOSIS — F33.41 RECURRENT MAJOR DEPRESSIVE DISORDER IN PARTIAL REMISSION (HCC): ICD-10-CM

## 2024-11-19 DIAGNOSIS — K58.0 IRRITABLE BOWEL SYNDROME WITH DIARRHEA: ICD-10-CM

## 2024-11-19 PROBLEM — E66.812 CLASS 2 SEVERE OBESITY DUE TO EXCESS CALORIES WITH SERIOUS COMORBIDITY AND BODY MASS INDEX (BMI) OF 37.0 TO 37.9 IN ADULT (HCC): Status: RESOLVED | Noted: 2021-12-20 | Resolved: 2024-11-19

## 2024-11-19 PROBLEM — M75.21 BICEPS TENDINITIS, RIGHT: Status: RESOLVED | Noted: 2021-11-29 | Resolved: 2024-11-19

## 2024-11-19 PROBLEM — E66.01 CLASS 2 SEVERE OBESITY DUE TO EXCESS CALORIES WITH SERIOUS COMORBIDITY AND BODY MASS INDEX (BMI) OF 37.0 TO 37.9 IN ADULT (HCC): Status: RESOLVED | Noted: 2021-12-20 | Resolved: 2024-11-19

## 2024-11-19 PROBLEM — R68.82 DECREASED LIBIDO: Status: RESOLVED | Noted: 2020-09-01 | Resolved: 2024-11-19

## 2024-11-19 PROCEDURE — 3079F DIAST BP 80-89 MM HG: CPT | Performed by: FAMILY MEDICINE

## 2024-11-19 PROCEDURE — 3008F BODY MASS INDEX DOCD: CPT | Performed by: FAMILY MEDICINE

## 2024-11-19 PROCEDURE — 99396 PREV VISIT EST AGE 40-64: CPT | Performed by: FAMILY MEDICINE

## 2024-11-19 PROCEDURE — 3074F SYST BP LT 130 MM HG: CPT | Performed by: FAMILY MEDICINE

## 2024-11-19 RX ORDER — BUPROPION HYDROCHLORIDE 150 MG/1
450 TABLET ORAL EVERY MORNING
Qty: 90 TABLET | Refills: 3 | Status: SHIPPED | OUTPATIENT
Start: 2024-11-19

## 2024-11-19 NOTE — PROGRESS NOTES
Note to patient: The 21st Century Cures Act makes medical notes like these available to patients in the interest of transparency. However, be advised this is a medical document. It is intended as peer to peer communication. It is written in medical language and may contain abbreviations or verbiage that are unfamiliar. It may appear blunt or direct. Medical documents are intended to carry relevant information, facts as evident, and the clinical opinion of the practitioner.         Chief Complaint   Patient presents with    Physical    Spot     Patient c/o black spot on left inner thigh x 6 months     Referral     Patient asking for referral to GI for lower gi issues        HPI:  Patient is here for physical.     Labs reviewed-   Hemoglobin A1c 5.6 which is improved from previous A1c which was 6.1., CBC is normal, CMP is normal, lipid panel normal.    Cervical Pap smear came back negative/normal in August 2023.    [place] has elveated liver enzymes for the first time.  Deneis abdominal pain, jaundice, hepatitis exposure, travel, v/n/d.    LDL got better.      She has been doing portion control.     Patient has a history of attention deficit disorder diagnosed in 2005.    She was on Vyvanse - 70 mg once a day. She stopped taking it in December 2023. She does have episodes of brain fog and trouble with focusing.  She had stopped it because she couldn't get a script for it nearby.   Stress test in 2018 was normal.     Anxiety/depression- she is on 450mg of wellbutrin since 1993. Reports her symptoms are controlled.     Ultrasound abdomen 9/20/2023 showed fatty liver disease.  Mammogram February 2024 BI-RADS 2.    She would like to see GI, she was told that she has IBS  She has had three colonoscopies. Complains of more diarrhea, loose stools, hs had to take imodium more than usual to control the symptoms. Denies blood in stool. She is going 3-5 times per day.   Last colonoscopy (scanned in media) normal in 2018 10 year  recall.   Daughter and son have celiac. She has never been checked for celiac.         The 10-year ASCVD risk score (Fe LOPEZ, et al., 2019) is: 1.9%    Values used to calculate the score:      Age: 59 years      Sex: Female      Is Non- : No      Diabetic: No      Tobacco smoker: No      Systolic Blood Pressure: 120 mmHg      Is BP treated: No      HDL Cholesterol: 62 mg/dL      Total Cholesterol: 174 mg/dL        Wt Readings from Last 6 Encounters:   11/19/24 222 lb (100.7 kg)   03/27/24 220 lb (99.8 kg)   03/20/24 220 lb (99.8 kg)   02/26/24 220 lb (99.8 kg)   12/06/23 220 lb 12.8 oz (100.2 kg)   10/17/23 217 lb 2 oz (98.5 kg)        Smoking: none  Alcohol: occasionally   Drugs: none   Sexual hx: 1 partner   STD hx: declined  No fmhx of breast or colon cancer.   Father had colon polyps.   Pap smear: utd - 2023           Review of Systems   Constitutional: Negative for fever, chills and fatigue. No distress.  HENT: Negative for hearing loss, congestion, sore throat, neck pain   Eyes: Negative for pain and visual disturbance.   Respiratory: Negative for cough, chest tightness, shortness of breath and wheezing.    Cardiovascular: Negative for chest pain, palpitations and leg swelling.   Gastrointestinal: Negative for nausea, vomiting, abdominal pain, diarrhea, blood in stool and abdominal distention.   Genitourinary: Negative for dysuria, hematuria and difficulty urinating.    Psychiatric/Behavioral: hx of depression and anxiety.     Patient Active Problem List   Diagnosis    Shortened [place] interval    Therapeutic drug monitoring    High risk medication use    Family history of colonic polyps, Father    Attention deficit hyperactivity disorder (ADHD), predominantly inattentive type    Recurrent major depressive disorder, in full remission (HCC)    Traumatic complete tear of right rotator cuff    Subacromial impingement of right shoulder    Class 2 obesity due to excess calories with body mass  index (BMI) of 36.0 to 36.9 in adult    Prediabetes    Class 2 severe obesity due to excess calories with serious comorbidity and body mass index (BMI) of 36.0 to 36.9 in adult (HCC)    Hypercholesterolemia    Transaminitis    Calculus of gallbladder without cholecystitis without obstruction    Irritable bowel syndrome with diarrhea       Past Medical History:    Anxiety    Arthritis    Attention deficit hyperactivity disorder (ADHD), predominantly inattentive type    BMI 34.0-34.9,adult    BMI 35.0-35.9,adult    Calculus of gallbladder without cholecystitis without obstruction    Class 2 obesity due to excess calories with body mass index (BMI) of 36.0 to 36.9 in adult    Depression    Esophageal reflux    Fracture, jaw ([facility])    Globus sensation    Major depressive disorder, recurrent episode, moderate ([facility])    Obesity    Recurrent major depressive disorder, in full remission ([facility])    On Wellbutrin  mg qam    Severe obesity (BMI 35.0-39.9)    BMI 38.00 2018    Sprain of right rotator cuff capsule    Transaminitis    Unspecified sleep apnea    Mild    Vitamin D deficiency     Past Surgical History:   Procedure Laterality Date    Ablation  2002    Colonoscopy  2018    Dr. Dozier    Hysterectomy  2003    partial. pt still has cervix and ovaries      , ,     Other surgical history      Broken jaw,  deviated septum,  deviated septum    Rotator cuff repair Right 2021    Tubal ligation       Family History   Problem Relation Age of Onset    Diabetes Father     Heart Disease Father     Cancer Father         Prostrate    Heart Disorder Father     Hypertension Father     Other (depression) Father     Other (hypothyroid) Father     Diabetes Mother     Hypertension Mother     Diabetes Paternal Grandmother     Diabetes Maternal Grandmother     Thyroid Disorder Brother         hyperthyroid    Eye Problems Brother         cataract    Cancer Brother         Lung     Hypertension Brother     Eye Problems Brother         cataract    Hypertension Brother     Obesity Brother     Depression Brother     Hypertension Son     Mental Disorder Daughter         ADD    Other (Fatty Liver) Daughter     Cancer Maternal Grandfather         colon cancer     Social History     Socioeconomic History    Marital status:    Tobacco Use    Smoking status: Never     Passive exposure: Never    Smokeless tobacco: Never   Vaping Use    Vaping status: Never Used   Substance and Sexual Activity    Alcohol use: Yes     Alcohol/week: 0.0 - 1.0 standard drinks of alcohol    Drug use: No    Sexual activity: Yes     Partners: Male   Other Topics Concern    Caffeine Concern Yes    Stress Concern Yes    Weight Concern Yes    Special Diet No    Exercise No    Seat Belt Yes     Social Drivers of Health      [facility], [facility]    Housing Stability       Current Outpatient Medications   Medication Sig Dispense Refill    buPROPion  MG Oral Tablet 24 Hr Take 3 tablets (450 mg total) by mouth every morning. 90 tablet 3       Allergies  Allergies   Allergen Reactions    Codeine NAUSEA ONLY       Health Maintenance  Immunizations:  Immunization History   Administered Date(s) Administered    Covid-19 Vaccine Pfizer 30 mcg/0.3 ml 01/21/2021, 02/11/2021, 10/11/2021    Covid-19 Vaccine Pfizer Bivalent 30mcg/0.3mL 12/10/2022    FLULAVAL 6 months & older 0.5 ml Prefilled syringe (52338) 10/09/2020    Flucelvax Influenza vaccine, trivalent (ccIIV3), 0.5mL IM 12/10/2022    Influenza 10/01/2012, 10/02/2017, 11/15/2019, 10/12/2021, 09/27/2023, 10/03/2024    TDAP 03/23/2015    Zoster Vaccine Recombinant Adjuvanted (Shingrix) 12/20/2021, 03/02/2022   Deferred Date(s) Deferred    FLULAVAL 6 months & older 0.5 ml Prefilled syringe (39402) 12/08/2022         Physical Exam  /80   Pulse 92   Temp 97.4 °F (36.3 °C) (Temporal)   Resp 16   Ht 5' 5\" (1.651 m)    Wt 222 lb (100.7 kg)   SpO2 96%   BMI 36.94 kg/m²   Constitutional: Oriented to person, place, and time. No distress.   HEENT:  Normocephalic and atraumatic. Hearing and tympanic membranes normal.  Nose normal. Oropharynx is clear and moist.   Eyes: Conjunctivae and EOM are normal. PERRLA. No scleral icterus.   Neck: Normal range of motion. Neck supple. No mass and no thyromegaly present.   Cardiovascular: Normal rate, regular rhythm and intact distal pulses.  No murmur, rubs or gallops.   Pulmonary/Chest: Effort normal and breath sounds normal. No respiratory distress. No wheezes, rhonchi or rales  Abdominal: Soft. Bowel sounds are normal. Non tender, no masses, no organomegaly or hernias.   Neurological: grossly normal   Skin: Skin is warm and dry.    Skin lesion hyperpigmented on the left anterior thigh 2mm.   Psychiatric: Normal mood and affect.     A/P:    Encounter Diagnoses   Name Primary?    Skin cancer screening     Therapeutic drug monitoring     Recurrent major depressive disorder in partial remission (HCC)     Breast cancer screening by mammogram     Recurrent major depressive disorder, in full remission (HCC)     Hypercholesterolemia     Family history of colonic polyps, Father     Attention deficit hyperactivity disorder (ADHD), predominantly inattentive type     Well adult exam Yes    Irritable bowel syndrome with diarrhea         1. Skin cancer screening  Hyperpigmented nevi 2mm present for two months on the left anterior thigh. It has grown.   \referred to derm  - Derm Referral - In Network    2. Therapeutic drug monitoring  - buPROPion  MG Oral Tablet 24 Hr; Take 3 tablets (450 mg total) by mouth every morning.  Dispense: 90 tablet; Refill: 3    3. Recurrent major depressive disorder in partial remission (HCC)  Pt has a history of treated depression and is doing well on the current treatment regimen. Patient feels the depression is controlled well. Would like continue the present meds. Has  no significant side effects on the present meds. Pt does not feel the need for counseling at this time.   - buPROPion  MG Oral Tablet 24 Hr; Take 3 tablets (450 mg total) by mouth every morning.  Dispense: 90 tablet; Refill: 3    4. Breast cancer screening by mammogram  - [facility] CARROL 2D+3D SCREENING BILAT (CPT=77067/33702); Future    5. Recurrent major depressive disorder, in full remission (HCC)  As above     6. Hypercholesterolemia  FLP normal     7. Family history of colonic polyps, Father  Colonoscopy is utd.     8. Attention deficit hyperactivity disorder (ADHD), predominantly inattentive type  No longer on vyvanse  Would avoid this due to her age.     9. Well adult exam  - -Discussed diet and exercise, counseled on vaccine and screening guidelines.     10. Irritable bowel syndrome with diarrhea  Referred to GI for ongoing symptoms.   Recommended low fodmap diet.   - GASTRO - INTERNAL      No orders of the defined types were placed in this encounter.      Meds & Refills for this Visit:  Requested Prescriptions     Signed Prescriptions Disp Refills    buPROPion  MG Oral Tablet 24 Hr 90 tablet 3     Sig: Take 3 tablets (450 mg total) by mouth every morning.       Imaging & Consults:  GASTRO - INTERNAL  DERM - INTERNAL  [facility] CARROL 2D+3D SCREENING BILAT (CPT=77067/87730)      No follow-ups on file.    There are no Patient Instructions on file for this visit.    All questions were answered and the patient understands the plan.     Apryl Alonzo,         This note was prepared using Dragon Medical voice recognition dictation software. As a result errors may occur. When identified these errors have been corrected. While every attempt is made to correct errors during dictation discrepancies may still exist.

## 2025-02-05 ENCOUNTER — LAB ENCOUNTER (OUTPATIENT)
Dept: LAB | Age: 60
End: 2025-02-05
Attending: FAMILY MEDICINE
Payer: COMMERCIAL

## 2025-02-05 DIAGNOSIS — K58.0 IRRITABLE BOWEL SYNDROME WITH DIARRHEA: ICD-10-CM

## 2025-02-05 DIAGNOSIS — R19.7 DIARRHEA, UNSPECIFIED TYPE: ICD-10-CM

## 2025-02-05 LAB
IGA SERPL-MCNC: 520 MG/DL (ref 70–312)
TSI SER-ACNC: 3.32 UIU/ML (ref 0.55–4.78)

## 2025-02-05 PROCEDURE — 36415 COLL VENOUS BLD VENIPUNCTURE: CPT

## 2025-02-05 PROCEDURE — 86364 TISS TRNSGLTMNASE EA IG CLAS: CPT

## 2025-02-05 PROCEDURE — 84443 ASSAY THYROID STIM HORMONE: CPT

## 2025-02-05 PROCEDURE — 82784 ASSAY IGA/IGD/IGG/IGM EACH: CPT

## 2025-02-06 LAB — TTG IGA SER-ACNC: 1.5 U/ML (ref ?–7)

## 2025-02-20 DIAGNOSIS — F33.41 RECURRENT MAJOR DEPRESSIVE DISORDER IN PARTIAL REMISSION: ICD-10-CM

## 2025-02-20 DIAGNOSIS — Z51.81 THERAPEUTIC DRUG MONITORING: ICD-10-CM

## 2025-02-20 RX ORDER — BUPROPION HYDROCHLORIDE 150 MG/1
450 TABLET ORAL EVERY MORNING
Qty: 270 TABLET | Refills: 1 | Status: SHIPPED | OUTPATIENT
Start: 2025-02-20

## 2025-02-20 NOTE — TELEPHONE ENCOUNTER
Requesting Bupropion 150mg  LOV: 11/19/24 Physical  RTC: 1 year  Last Relevant Labs: 11/9/24  Filled: 11/19/24 #90 with 3 refills    Future Appointments   Date Time Provider Department Center   2/25/2025 12:30 PM Nicole Laurent DO [facility]   2/25/2025  1:00 PM Nicole Laurent DO [facility]     Psychiatric Non-Scheduled (Anti-Anxiety) [id number]/20/2025 12:25 AM   Protocol Details   In person appointment or virtual visit in the past 6 mos or appointment in next 3 mos    Depression Screening completed within the past 12 months    Medication is active on med list     Rx sent to pharmacy per protocol

## 2025-02-25 PROBLEM — K21.9 GERD (GASTROESOPHAGEAL REFLUX DISEASE): Status: ACTIVE | Noted: 2025-02-25

## 2025-02-25 PROBLEM — R19.7 DIARRHEA: Status: ACTIVE | Noted: 2025-02-25

## 2025-02-25 PROBLEM — D12.3 BENIGN NEOPLASM OF TRANSVERSE COLON: Status: ACTIVE | Noted: 2025-02-25

## 2025-04-24 ENCOUNTER — HOSPITAL ENCOUNTER (OUTPATIENT)
Dept: GENERAL RADIOLOGY | Facility: HOSPITAL | Age: 60
Discharge: HOME | End: 2025-04-24
Attending: INTERNAL MEDICINE
Payer: COMMERCIAL

## 2025-04-24 DIAGNOSIS — R93.3 IMAGING OF GASTROINTESTINAL TRACT ABNORMAL: ICD-10-CM

## 2025-04-24 DIAGNOSIS — R19.7 DIARRHEA, UNSPECIFIED TYPE: ICD-10-CM

## 2025-04-24 PROCEDURE — 74270 X-RAY XM COLON 1CNTRST STD: CPT | Performed by: INTERNAL MEDICINE

## (undated) DIAGNOSIS — M54.50 LOW BACK PAIN, UNSPECIFIED BACK PAIN LATERALITY, UNSPECIFIED CHRONICITY, UNSPECIFIED WHETHER SCIATICA PRESENT: ICD-10-CM

## (undated) DIAGNOSIS — M47.816 OSTEOARTHRITIS OF LUMBAR SPINE, UNSPECIFIED SPINAL OSTEOARTHRITIS COMPLICATION STATUS: ICD-10-CM

## (undated) DIAGNOSIS — G44.209 TENSION HEADACHE: ICD-10-CM

## (undated) DIAGNOSIS — M43.16 ANTEROLISTHESIS OF LUMBAR SPINE: Primary | ICD-10-CM

## (undated) NOTE — MR AVS SNAPSHOT
[facility] Sarah FerraroLance Polina  [phone]               Thank you for choosing us for your health care visit with Sean Inman DO.   We are glad to serve you and happy to provide you with this s · Apply ice or a cold pack 10 to 15 minutes every hour you’re awake for the first 2 days. · After the swelling goes down, use cold or heat to control pain. Don’t use heat late in the day, since it can cause swelling when you’re not active.   Rest and eleva Commonly known as:  ZANAFLEX           * topiramate 25 MG Tabs   Commonly known as: Topamax           * topiramate 100 MG Tabs   Take 2 tablets by mouth every morning. 1 tablet in the afternoon   Commonly known as:   Topamax           TraZODone HCl 50 MG T Make half your plate fruits and vegetables Highly refined, white starches including white bread, rice and pasta   Eat plenty of protein, keep the fat content low Sugars:  sodas and sports drinks, candies and desserts   Eat plenty of low-fat dairy products

## (undated) NOTE — LETTER
21  [facility]  Pre-Operative Clearance Request    Patient Name:   Zaira Samuel             :   1965    Surgeon: Dr. Teri Samuel             Date of Surgery: 21     Surgical Procedure: RIGHT SHOULDER ARTHROSCOP

## (undated) NOTE — LETTER
Date: 4/6/2020    Patient Name: Wendy Topete          To Whom it may concern:     The above patient was evaluated over the phone by the [facility] for treatment of a non-respiratory medical issue          Sincerely,    Fredy Yan DO

## (undated) NOTE — LETTER
Date: 3/20/2024    Patient Name: Sherrill Cowart          To Whom it may concern:    This letter has been written at the patient's request. The above patient was seen at [facility] for treatment of a medical condition.    This patient may return to work on 3/20/24 without restrictions.       Sincerely,        Yayo Holloway MD  Knee, Shoulder, & Elbow Surgery / Sports Medicine Specialist  Orthopaedic Surgery  [address] [place] [address].[email or web address]  [email or web address]  t: [phone]  o: [phone]  f: [phone]

## (undated) NOTE — LETTER
Date: 2/8/2022    Patient Name: Milady Esparza          To Whom it may concern: This letter has been written at the patient's request. The above patient was seen at one of the [facility] locations for treatment of a medical condition. The patient may return to work work on 2/16/22 with the following restrictions:  with the following restrictions:  No ladders and No lifting or carrying more than 30 lbs. Sincerely,        Anna Spears. Yolanda Hannah MD  Knee, Shoulder, & Elbow Surgery / Sports Medicine Specialist  [facility] Orthopaedic Surgery  [address], [address] [place] [address]. [email or web address]  t: [phone]  o: [phone]  f: [phone]

## (undated) NOTE — LETTER
Date: 11/19/2021    Patient Name: Kylah Pryor          To Whom it may concern: This letter has been written at the patient's request. The above patient was seen at one of the [facility] for treatment of a medical condition.

## (undated) NOTE — LETTER
18    Patient: Rosita Nicole  : 1965 Visit date: 3/27/2018    Dear  Dr. Mike Posada,    Thank you for referring Rosita Nicole to my practice. Please find my assessment and plan below.             Assessment   Mucous in stools  (primary encounter inguinal hernias present. No rebound or guarding. This patient has been scheduled for colonoscopy at the center for surgery on April 3, 2018.     All risks, benefits, complications and alternatives to the proposed operation were fully discussed with the